# Patient Record
Sex: MALE | Race: BLACK OR AFRICAN AMERICAN | NOT HISPANIC OR LATINO | Employment: FULL TIME | URBAN - METROPOLITAN AREA
[De-identification: names, ages, dates, MRNs, and addresses within clinical notes are randomized per-mention and may not be internally consistent; named-entity substitution may affect disease eponyms.]

---

## 2017-04-26 ENCOUNTER — OFFICE VISIT (OUTPATIENT)
Dept: FAMILY MEDICINE | Facility: CLINIC | Age: 27
End: 2017-04-26
Payer: COMMERCIAL

## 2017-04-26 VITALS
TEMPERATURE: 98 F | HEIGHT: 72 IN | DIASTOLIC BLOOD PRESSURE: 58 MMHG | HEART RATE: 72 BPM | BODY MASS INDEX: 22.27 KG/M2 | SYSTOLIC BLOOD PRESSURE: 118 MMHG | WEIGHT: 164.44 LBS

## 2017-04-26 DIAGNOSIS — Z41.2 MALE CIRCUMCISION: ICD-10-CM

## 2017-04-26 DIAGNOSIS — K64.9 HEMORRHOIDS, UNSPECIFIED HEMORRHOID TYPE: ICD-10-CM

## 2017-04-26 DIAGNOSIS — Z00.00 GENERAL MEDICAL EXAM: Primary | ICD-10-CM

## 2017-04-26 DIAGNOSIS — F17.200 SMOKER: ICD-10-CM

## 2017-04-26 DIAGNOSIS — S03.40XS TMJ (SPRAIN OF TEMPOROMANDIBULAR JOINT), SEQUELA: ICD-10-CM

## 2017-04-26 PROCEDURE — 99999 PR PBB SHADOW E&M-NEW PATIENT-LVL IV: CPT | Mod: PBBFAC,,, | Performed by: FAMILY MEDICINE

## 2017-04-26 PROCEDURE — 99385 PREV VISIT NEW AGE 18-39: CPT | Mod: S$GLB,,, | Performed by: FAMILY MEDICINE

## 2017-04-26 RX ORDER — VARENICLINE TARTRATE 1 MG/1
1 TABLET, FILM COATED ORAL 2 TIMES DAILY
Qty: 60 TABLET | Refills: 3 | Status: SHIPPED | OUTPATIENT
Start: 2017-04-26 | End: 2017-05-08

## 2017-04-26 RX ORDER — VARENICLINE TARTRATE 0.5 (11)-1
KIT ORAL
Qty: 1 PACKAGE | Refills: 0 | Status: SHIPPED | OUTPATIENT
Start: 2017-04-26 | End: 2017-05-08

## 2017-04-26 NOTE — MR AVS SNAPSHOT
[facility]  [address] [facility], [address]  [facility] [address]  Phone: [phone]  Fax: [phone]                  William Schwartz   2017 2:00 PM   Office Visit    Description:  Male : 1990   Provider:  Roberta Butt DO   Department:  [facility]           Reason for Visit     Annual Exam           Diagnoses this Visit        Comments    General medical exam    -  Primary     Hemorrhoids, unspecified hemorrhoid type         TMJ (sprain of temporomandibular joint), sequela         Smoker         Male circumcision                To Do List           Goals (5 Years of Data)     None       These Medications        Disp Refills Start End    varenicline (CHANTIX STARTING MONTH BOX) 0.5 mg (11)- 1 mg (42) tablet 1 Package 0 2017     Take one 0.5mg tab by mouth once daily X3 days,then increase to one 0.5mg tab twice daily X4 days,then increase to one 1mg tab twice daily    Pharmacy: [facility] [address] [place] [address] AVE AT [address] Ph #: [phone]       varenicline (CHANTIX) 1 mg Tab 60 tablet 3 2017     Take 1 tablet (1 mg total) by mouth 2 (two) times daily. - Oral    Pharmacy: [facility] [address] [place] [address] AVE AT [address] Ph #: [phone]         [facility]     [facility] Nurse Care Line -  Assistance  Unless otherwise directed by your provider, please contact [facility], our nurse care line that is available for 24/ assistance.     Registered nurses in the [facility] Center provide: appointment scheduling, clinical advisement, health education, and other advisory services.  Call: [phone] (toll free)               Medications           Message regarding Medications     Verify the changes and/or additions to your medication regime listed below are the same as discussed with your clinician today.   "If any of these changes or additions are incorrect, please notify your healthcare provider.        START taking these NEW medications        Refills    varenicline (CHANTIX STARTING MONTH BOX) 0.5 mg (11)- 1 mg (42) tablet 0    Sig: Take one 0.5mg tab by mouth once daily X3 days,then increase to one 0.5mg tab twice daily X4 days,then increase to one 1mg tab twice daily    Class: Print    varenicline (CHANTIX) 1 mg Tab 3    Sig: Take 1 tablet (1 mg total) by mouth 2 (two) times daily.    Class: Normal    Route: Oral           Verify that the below list of medications is an accurate representation of the medications you are currently taking.  If none reported, the list may be blank. If incorrect, please contact your healthcare provider. Carry this list with you in case of emergency.           Current Medications     varenicline (CHANTIX STARTING MONTH BOX) 0.5 mg (11)- 1 mg (42) tablet Take one 0.5mg tab by mouth once daily X3 days,then increase to one 0.5mg tab twice daily X4 days,then increase to one 1mg tab twice daily    varenicline (CHANTIX) 1 mg Tab Take 1 tablet (1 mg total) by mouth 2 (two) times daily.           Clinical Reference Information           Your Vitals Were     BP Pulse Temp    118/58 (BP Location: Right arm, Patient Position: Sitting, BP Method: Manual) 72 98.3 °F (36.8 °C) (Oral)    Height Weight BMI    5' 11.75" (1.822 m) 74.6 kg (164 lb 7.4 oz) 22.46 kg/m2      Blood Pressure          Most Recent Value    BP  (!)  118/58      Allergies as of 4/26/2017     Shellfish Containing Products      Immunizations Administered on Date of Encounter - 4/26/2017     None      Orders Placed During Today's Visit      Normal Orders This Visit    Ambulatory consult to Urology     Future Labs/Procedures Expected by Expires    CBC auto differential  4/26/2017 6/25/2018    Comprehensive metabolic panel  4/26/2017 6/25/2018    Lipid panel  4/26/2017 6/25/2018    TSH  4/26/2017 6/25/2018      Instructions  "   miralax-       Smoking Cessation     If you would like to quit smoking:   You may be eligible for free services if you are a [place] resident and started smoking cigarettes before September 1, 1988.  Call the Smoking Cessation Trust ([facility]) toll free at [phone] or [phone].   Call 1-800-QUIT-NOW if you do not meet the above criteria.   Contact us via email: [email or web address]   View our website for more information: [email or web address]        Language Assistance Services     ATTENTION: Language assistance services are available, free of charge. Please call [phone].      ATENCIÓN: Si habla español, tiene a carrillo disposición servicios gratuitos de asistencia lingüística. Llame al [phone].     CHÚ Ý: N?u b?n nói Ti?ng Vi?t, có các d?ch v? h? tr? ngôn ng? mi?n phí dành cho b?n. G?i s? [phone].         [facility] complies with applicable Federal civil rights laws and does not discriminate on the basis of race, color, national origin, age, disability, or sex.

## 2017-04-26 NOTE — PROGRESS NOTES
Subjective:       Patient ID: William Schwartz is a 27 y.o. male.    Chief Complaint: Annual Exam    HPI  Review of Systems   Constitutional: Negative.  Negative for appetite change, diaphoresis and fatigue.   HENT: Negative.  Negative for congestion, hearing loss and sneezing.    Eyes: Negative for pain, discharge and visual disturbance.   Respiratory: Negative for apnea, choking, shortness of breath and wheezing.    Cardiovascular: Negative for chest pain and leg swelling.   Gastrointestinal: Negative for abdominal distention, blood in stool, nausea and vomiting.   Genitourinary: Negative for difficulty urinating, discharge, flank pain, testicular pain and urgency.        States last Year he had to go to the ed for a kidney infection- no trouble since.   He is also worried he may have a hemorrhoid or anal fissure. He notice a small bump near the rectum and he feels sore burning after wiping after a BM and he has noticed some blood on tp after wiping. He notices this more chronically for a few months  He is also interested in having a circumcision done   Musculoskeletal: Negative for arthralgias, joint swelling, myalgias, neck pain and neck stiffness.   Skin: Negative.  Negative for color change and rash.   Neurological: Negative.  Negative for dizziness, syncope, weakness and light-headedness.   Hematological: Negative for adenopathy. Does not bruise/bleed easily.   Psychiatric/Behavioral: Negative for agitation, confusion, dysphoric mood and hallucinations.       Objective:      Physical Exam   Constitutional: He is oriented to person, place, and time. He appears well-developed and well-nourished.   HENT:   Head: Normocephalic and atraumatic.   Right Ear: External ear normal.   Left Ear: External ear normal.   Nose: Nose normal.   Mouth/Throat: Oropharynx is clear and moist.   Some crepitus at TM joint and pt has limited ROM with opening his mouth -only about 50% of normal . No pain at joint on palpation   Eyes:  Conjunctivae and EOM are normal. Pupils are equal, round, and reactive to light.   Neck: Normal range of motion. Neck supple. No JVD present. No thyromegaly present.   Cardiovascular: Normal rate, regular rhythm, normal heart sounds and intact distal pulses.    No murmur heard.  Pulmonary/Chest: Effort normal and breath sounds normal.   Abdominal: Soft. Bowel sounds are normal. He exhibits no mass. There is no tenderness.   Musculoskeletal: Normal range of motion. He exhibits no edema.   Lymphadenopathy:     He has no cervical adenopathy.   Neurological: He is alert and oriented to person, place, and time. He has normal reflexes.   Skin: Skin is warm and dry.   Small skin tag about perianal region   Psychiatric: He has a normal mood and affect. His behavior is normal. Judgment and thought content normal.   Pt appears mildly anxious   Vitals reviewed.      Assessment:         William was seen today for annual exam.    Diagnoses and all orders for this visit:    General medical exam  -     CBC auto differential; Future  -     Comprehensive metabolic panel; Future  -     Lipid panel; Future  -     TSH; Future    Hemorrhoids, unspecified hemorrhoid type  Literature provided. Recommend high fiber diet, and miralax and wiping with wet wipes.   If not better (especially if he still notices blood then he should let me know and Id recommend referral to colorectal specialists)    TMJ (sprain of temporomandibular joint), sequela  Since this is not painful there is nothing special to do. This could be an issue with intubation should he ever need that and I recommend he mention it should he need elective surgery    Smoker  Recommend he quit. Discussed chantix and literature and rx provided   varenicline (CHANTIX STARTING MONTH BOX) 0.5 mg (11)- 1 mg (42) tablet; Take one 0.5mg tab by mouth once daily X3 days,then increase to one 0.5mg tab twice daily X4 days,then increase to one 1mg tab twice daily  -     varenicline (CHANTIX) 1 mg  Tab; Take 1 tablet (1 mg total) by mouth 2 (two) times daily.      Male circumcision desired  -     Ambulatory consult to Urology      -

## 2017-05-06 ENCOUNTER — LAB VISIT (OUTPATIENT)
Dept: LAB | Facility: HOSPITAL | Age: 27
End: 2017-05-06
Attending: FAMILY MEDICINE
Payer: COMMERCIAL

## 2017-05-06 DIAGNOSIS — Z00.00 GENERAL MEDICAL EXAM: ICD-10-CM

## 2017-05-06 LAB
ALBUMIN SERPL BCP-MCNC: 4.1 G/DL
ALP SERPL-CCNC: 61 U/L
ALT SERPL W/O P-5'-P-CCNC: 85 U/L
ANION GAP SERPL CALC-SCNC: 8 MMOL/L
AST SERPL-CCNC: 320 U/L
BASOPHILS # BLD AUTO: 0.02 K/UL
BASOPHILS NFR BLD: 0.3 %
BILIRUB SERPL-MCNC: 0.9 MG/DL
BUN SERPL-MCNC: 9 MG/DL
CALCIUM SERPL-MCNC: 9.4 MG/DL
CHLORIDE SERPL-SCNC: 105 MMOL/L
CHOLEST/HDLC SERPL: 1.8 {RATIO}
CO2 SERPL-SCNC: 28 MMOL/L
CREAT SERPL-MCNC: 1.1 MG/DL
DIFFERENTIAL METHOD: NORMAL
EOSINOPHIL # BLD AUTO: 0.2 K/UL
EOSINOPHIL NFR BLD: 3.9 %
ERYTHROCYTE [DISTWIDTH] IN BLOOD BY AUTOMATED COUNT: 13.8 %
EST. GFR  (AFRICAN AMERICAN): >60 ML/MIN/1.73 M^2
EST. GFR  (NON AFRICAN AMERICAN): >60 ML/MIN/1.73 M^2
GLUCOSE SERPL-MCNC: 82 MG/DL
HCT VFR BLD AUTO: 43.8 %
HDL/CHOLESTEROL RATIO: 56.9 %
HDLC SERPL-MCNC: 130 MG/DL
HDLC SERPL-MCNC: 74 MG/DL
HGB BLD-MCNC: 14.1 G/DL
LDLC SERPL CALC-MCNC: 51.8 MG/DL
LYMPHOCYTES # BLD AUTO: 2.4 K/UL
LYMPHOCYTES NFR BLD: 40.2 %
MCH RBC QN AUTO: 28.2 PG
MCHC RBC AUTO-ENTMCNC: 32.2 %
MCV RBC AUTO: 88 FL
MONOCYTES # BLD AUTO: 0.6 K/UL
MONOCYTES NFR BLD: 10.1 %
NEUTROPHILS # BLD AUTO: 2.7 K/UL
NEUTROPHILS NFR BLD: 45.5 %
NONHDLC SERPL-MCNC: 56 MG/DL
PLATELET # BLD AUTO: 218 K/UL
PMV BLD AUTO: 10.7 FL
POTASSIUM SERPL-SCNC: 4.7 MMOL/L
PROT SERPL-MCNC: 7 G/DL
RBC # BLD AUTO: 5 M/UL
SODIUM SERPL-SCNC: 141 MMOL/L
TRIGL SERPL-MCNC: 21 MG/DL
TSH SERPL DL<=0.005 MIU/L-ACNC: 0.63 UIU/ML
WBC # BLD AUTO: 5.87 K/UL

## 2017-05-06 PROCEDURE — 80053 COMPREHEN METABOLIC PANEL: CPT

## 2017-05-06 PROCEDURE — 84443 ASSAY THYROID STIM HORMONE: CPT

## 2017-05-06 PROCEDURE — 80061 LIPID PANEL: CPT

## 2017-05-06 PROCEDURE — 85025 COMPLETE CBC W/AUTO DIFF WBC: CPT

## 2017-05-06 PROCEDURE — 36415 COLL VENOUS BLD VENIPUNCTURE: CPT | Mod: PO

## 2017-05-08 ENCOUNTER — OFFICE VISIT (OUTPATIENT)
Dept: UROLOGY | Facility: CLINIC | Age: 27
End: 2017-05-08
Attending: UROLOGY
Payer: COMMERCIAL

## 2017-05-08 VITALS
SYSTOLIC BLOOD PRESSURE: 130 MMHG | BODY MASS INDEX: 22.54 KG/M2 | HEIGHT: 72 IN | HEART RATE: 68 BPM | WEIGHT: 166.44 LBS | DIASTOLIC BLOOD PRESSURE: 81 MMHG

## 2017-05-08 DIAGNOSIS — N47.1 PHIMOSIS: Primary | ICD-10-CM

## 2017-05-08 PROCEDURE — 99244 OFF/OP CNSLTJ NEW/EST MOD 40: CPT | Mod: S$GLB,,, | Performed by: UROLOGY

## 2017-05-08 PROCEDURE — 99999 PR PBB SHADOW E&M-EST. PATIENT-LVL III: CPT | Mod: PBBFAC,,, | Performed by: UROLOGY

## 2017-05-08 NOTE — H&P
"Subjective:      William Schwartz is a 27 y.o. male who was referred by Roberta Butt DO for evaluation of phimsosis.      He is uncircumcised and has intermittent difficulty retracting foreskin. Also reports occasional pain during intercourse and tears in foreskin. He is currently able to fully retract.    The following portions of the patient's history were reviewed and updated as appropriate: allergies, current medications, past family history, past medical history, past social history, past surgical history and problem list.    Review of Systems  Constitutional: no fever or chills  ENT: no nasal congestion or sore throat  Respiratory: no cough or shortness of breath  Cardiovascular: no chest pain or palpitations  Gastrointestinal: no nausea or vomiting, tolerating diet  Genitourinary: as per HPI  Hematologic/Lymphatic: no easy bruising or lymphadenopathy  Musculoskeletal: no arthralgias or myalgias  Neurological: no seizures or tremors  Behavioral/Psych: no auditory or visual hallucinations     Objective:   Vitals: /81 (BP Location: Left arm, Patient Position: Sitting, BP Method: Automatic)  Pulse 68  Ht 5' 11.5" (1.816 m)  Wt 75.5 kg (166 lb 7.2 oz)  BMI 22.89 kg/m2    Physical Exam   General: alert and oriented, no acute distress  Head: normocephalic, atraumatic  Neck: supple, no lymphadenopathy, normal ROM, no masses  Respiratory: Symmetric expansion, non-labored breathing  Cardiovascular: regular rate and rhythm, nomal pulses, no peripheral edema  Abdomen: soft, non tender, non distended, no palpable masses, no hernias, no hepatomegaly or splenomegaly  Genitourinary:   Penis: no lesions, patent orthotopic meatus, no plaques; mildly phimotic foreskin  Scrotum: no rashes or skin changes;   Testes: descended bilaterally, no masses, nontender, normal epididymides bilaterally, no hydroceles  Skin: normal coloration and turgor, no rashes, no suspicious skin lesions noted  Neuro: alert and oriented x3, no " gross deficits  Psych: normal judgment and insight, normal mood/affect and non-anxious    Lab Review     Lab Results   Component Value Date    WBC 5.87 05/06/2017    HGB 14.1 05/06/2017    HCT 43.8 05/06/2017    MCV 88 05/06/2017     05/06/2017     Lab Results   Component Value Date    CREATININE 1.1 05/06/2017    BUN 9 05/06/2017       Assessment:     1. Phimosis        Plan:   1. Discussed options - topical steroid vs circumcision. He elects for circumcision.  2. The risks and benefits of the procedure were discussed, including but not limited to bleeding, infection, loss of penile sensation, penile shortening, skin bridge, need for further procedures, and complications related to anesthesia.  Informed consent signed and placed in chart.    3. Circ 6/22 at [facility]

## 2017-05-08 NOTE — LETTER
May 8, 2017      Roberta Butt, DO  [address] [facility]  [address]  [facility] [address]           [facility]  [address] [place] [address], [address]  [facility] [address]  Phone: [phone]          Patient: William Schwartz   MR Number: [id number]   YOB: 1990   Date of Visit: 5/8/2017       Dear Dr. Roberta Butt:    Thank you for referring William Schwartz to me for evaluation. Attached you will find relevant portions of my assessment and plan of care.    If you have questions, please do not hesitate to call me. I look forward to following William Schwartz along with you.    Sincerely,    Nicola Rhodes MD    Enclosure  CC:  No Recipients    If you would like to receive this communication electronically, please contact [email or web address] or [phone] to request more information on [employer] Link access.    For providers and/or their staff who would like to refer a patient to [facility], please contact us through our one-stop-shop provider referral line, [facility], at [phone].    If you feel you have received this communication in error or would no longer like to receive these types of communications, please e-mail [email or web address]

## 2017-05-08 NOTE — PROGRESS NOTES
"Subjective:      William Schwartz is a 27 y.o. male who was referred by Roberta Butt DO for evaluation of phimsosis.      He is uncircumcised and has intermittent difficulty retracting foreskin. Also reports occasional pain during intercourse and tears in foreskin. He is currently able to fully retract.    The following portions of the patient's history were reviewed and updated as appropriate: allergies, current medications, past family history, past medical history, past social history, past surgical history and problem list.    Review of Systems  Constitutional: no fever or chills  ENT: no nasal congestion or sore throat  Respiratory: no cough or shortness of breath  Cardiovascular: no chest pain or palpitations  Gastrointestinal: no nausea or vomiting, tolerating diet  Genitourinary: as per HPI  Hematologic/Lymphatic: no easy bruising or lymphadenopathy  Musculoskeletal: no arthralgias or myalgias  Neurological: no seizures or tremors  Behavioral/Psych: no auditory or visual hallucinations     Objective:   Vitals: /81 (BP Location: Left arm, Patient Position: Sitting, BP Method: Automatic)  Pulse 68  Ht 5' 11.5" (1.816 m)  Wt 75.5 kg (166 lb 7.2 oz)  BMI 22.89 kg/m2    Physical Exam   General: alert and oriented, no acute distress  Head: normocephalic, atraumatic  Neck: supple, no lymphadenopathy, normal ROM, no masses  Respiratory: Symmetric expansion, non-labored breathing  Cardiovascular: regular rate and rhythm, nomal pulses, no peripheral edema  Abdomen: soft, non tender, non distended, no palpable masses, no hernias, no hepatomegaly or splenomegaly  Genitourinary:   Penis: no lesions, patent orthotopic meatus, no plaques; mildly phimotic foreskin  Scrotum: no rashes or skin changes;   Testes: descended bilaterally, no masses, nontender, normal epididymides bilaterally, no hydroceles  Skin: normal coloration and turgor, no rashes, no suspicious skin lesions noted  Neuro: alert and oriented x3, no " gross deficits  Psych: normal judgment and insight, normal mood/affect and non-anxious    Lab Review     Lab Results   Component Value Date    WBC 5.87 05/06/2017    HGB 14.1 05/06/2017    HCT 43.8 05/06/2017    MCV 88 05/06/2017     05/06/2017     Lab Results   Component Value Date    CREATININE 1.1 05/06/2017    BUN 9 05/06/2017       Assessment:     1. Phimosis        Plan:   1. Discussed options - topical steroid vs circumcision. He elects for circumcision.  2. The risks and benefits of the procedure were discussed, including but not limited to bleeding, infection, loss of penile sensation, penile shortening, skin bridge, need for further procedures, and complications related to anesthesia.  Informed consent signed and placed in chart.    3. Circ 6/22 at [facility]

## 2017-05-09 DIAGNOSIS — R79.89 ELEVATED LFTS: Primary | ICD-10-CM

## 2017-05-23 ENCOUNTER — PATIENT MESSAGE (OUTPATIENT)
Dept: FAMILY MEDICINE | Facility: CLINIC | Age: 27
End: 2017-05-23

## 2017-05-27 ENCOUNTER — LAB VISIT (OUTPATIENT)
Dept: LAB | Facility: HOSPITAL | Age: 27
End: 2017-05-27
Attending: FAMILY MEDICINE
Payer: COMMERCIAL

## 2017-05-27 DIAGNOSIS — R79.89 ELEVATED LFTS: ICD-10-CM

## 2017-05-27 PROCEDURE — 87340 HEPATITIS B SURFACE AG IA: CPT

## 2017-05-27 PROCEDURE — 86703 HIV-1/HIV-2 1 RESULT ANTBDY: CPT

## 2017-05-27 PROCEDURE — 86200 CCP ANTIBODY: CPT

## 2017-05-27 PROCEDURE — 86706 HEP B SURFACE ANTIBODY: CPT

## 2017-05-27 PROCEDURE — 86038 ANTINUCLEAR ANTIBODIES: CPT

## 2017-05-27 PROCEDURE — 86790 VIRUS ANTIBODY NOS: CPT

## 2017-05-27 PROCEDURE — 86140 C-REACTIVE PROTEIN: CPT

## 2017-05-27 PROCEDURE — 83540 ASSAY OF IRON: CPT

## 2017-05-27 PROCEDURE — 86803 HEPATITIS C AB TEST: CPT

## 2017-05-27 PROCEDURE — 85651 RBC SED RATE NONAUTOMATED: CPT

## 2017-05-27 PROCEDURE — 86431 RHEUMATOID FACTOR QUANT: CPT

## 2017-05-27 PROCEDURE — 82104 ALPHA-1-ANTITRYPSIN PHENO: CPT

## 2017-05-27 PROCEDURE — 84443 ASSAY THYROID STIM HORMONE: CPT

## 2017-05-27 PROCEDURE — 80076 HEPATIC FUNCTION PANEL: CPT

## 2017-05-27 PROCEDURE — 82728 ASSAY OF FERRITIN: CPT

## 2017-05-29 LAB
ALBUMIN SERPL BCP-MCNC: 3.7 G/DL
ALP SERPL-CCNC: 89 U/L
ALT SERPL W/O P-5'-P-CCNC: 18 U/L
ANA SER QL IF: NORMAL
AST SERPL-CCNC: 23 U/L
BILIRUB DIRECT SERPL-MCNC: 0.2 MG/DL
BILIRUB SERPL-MCNC: 0.3 MG/DL
CCP AB SER IA-ACNC: <0.5 U/ML
CRP SERPL-MCNC: <0.1 MG/L
ERYTHROCYTE [SEDIMENTATION RATE] IN BLOOD BY WESTERGREN METHOD: 1 MM/HR
FERRITIN SERPL-MCNC: 67 NG/ML
HBV SURFACE AB SER-ACNC: NEGATIVE M[IU]/ML
HBV SURFACE AG SERPL QL IA: NEGATIVE
HCV AB SERPL QL IA: NEGATIVE
HEPATITIS A ANTIBODY, IGG: NEGATIVE
HIV 1+2 AB+HIV1 P24 AG SERPL QL IA: NEGATIVE
IRON SERPL-MCNC: 61 UG/DL
PROT SERPL-MCNC: 6.5 G/DL
RHEUMATOID FACT SERPL-ACNC: <10 IU/ML
SATURATED IRON: 19 %
TOTAL IRON BINDING CAPACITY: 321 UG/DL
TRANSFERRIN SERPL-MCNC: 217 MG/DL
TSH SERPL DL<=0.005 MIU/L-ACNC: 0.73 UIU/ML

## 2017-06-01 LAB
A1AT PHENOTYP SERPL-IMP: ABNORMAL BANDS
A1AT SERPL NEPH-MCNC: 97 MG/DL

## 2017-06-17 ENCOUNTER — DOCUMENTATION ONLY (OUTPATIENT)
Dept: FAMILY MEDICINE | Facility: CLINIC | Age: 27
End: 2017-06-17

## 2017-06-17 DIAGNOSIS — R79.89 ELEVATED LFTS: Primary | ICD-10-CM

## 2017-06-19 ENCOUNTER — TELEPHONE (OUTPATIENT)
Dept: UROLOGY | Facility: CLINIC | Age: 27
End: 2017-06-19

## 2017-06-19 NOTE — TELEPHONE ENCOUNTER
Patient called the office to cancel his CI[facility] on 6/22/17.  He states he will call back to reschedule sometime in late August or September

## 2017-06-19 NOTE — TELEPHONE ENCOUNTER
----- Message from Loren Canales RN sent at 6/19/2017  9:00 AM CDT -----  Regarding: postpone surgery  Patient called to cancel his preadmit appt.  States he wants to postpone surgery date.  I gave him the Urology phone number and instructed him to call the office to get a new surgery date.    Loren

## 2017-07-01 ENCOUNTER — LAB VISIT (OUTPATIENT)
Dept: LAB | Facility: HOSPITAL | Age: 27
End: 2017-07-01
Attending: FAMILY MEDICINE
Payer: COMMERCIAL

## 2017-07-01 DIAGNOSIS — R79.89 ELEVATED LFTS: ICD-10-CM

## 2017-07-01 PROCEDURE — 82103 ALPHA-1-ANTITRYPSIN TOTAL: CPT

## 2017-07-01 PROCEDURE — 36415 COLL VENOUS BLD VENIPUNCTURE: CPT | Mod: PO

## 2017-07-03 ENCOUNTER — PATIENT MESSAGE (OUTPATIENT)
Dept: FAMILY MEDICINE | Facility: CLINIC | Age: 27
End: 2017-07-03

## 2017-07-03 LAB — A1AT SERPL-MCNC: 121 MG/DL

## 2019-01-07 ENCOUNTER — OFFICE VISIT (OUTPATIENT)
Dept: FAMILY MEDICINE | Facility: CLINIC | Age: 29
End: 2019-01-07
Payer: COMMERCIAL

## 2019-01-07 VITALS
TEMPERATURE: 98 F | SYSTOLIC BLOOD PRESSURE: 102 MMHG | WEIGHT: 177.69 LBS | HEART RATE: 64 BPM | DIASTOLIC BLOOD PRESSURE: 60 MMHG | HEIGHT: 72 IN | BODY MASS INDEX: 24.07 KG/M2

## 2019-01-07 DIAGNOSIS — L03.90 CELLULITIS, UNSPECIFIED CELLULITIS SITE: ICD-10-CM

## 2019-01-07 DIAGNOSIS — H60.93 OTITIS EXTERNA OF BOTH EARS, UNSPECIFIED CHRONICITY, UNSPECIFIED TYPE: Primary | ICD-10-CM

## 2019-01-07 PROCEDURE — 99213 OFFICE O/P EST LOW 20 MIN: CPT | Mod: S$GLB,,, | Performed by: FAMILY MEDICINE

## 2019-01-07 PROCEDURE — 99999 PR PBB SHADOW E&M-EST. PATIENT-LVL III: ICD-10-PCS | Mod: PBBFAC,,, | Performed by: FAMILY MEDICINE

## 2019-01-07 PROCEDURE — 99213 PR OFFICE/OUTPT VISIT, EST, LEVL III, 20-29 MIN: ICD-10-PCS | Mod: S$GLB,,, | Performed by: FAMILY MEDICINE

## 2019-01-07 PROCEDURE — 99999 PR PBB SHADOW E&M-EST. PATIENT-LVL III: CPT | Mod: PBBFAC,,, | Performed by: FAMILY MEDICINE

## 2019-01-07 RX ORDER — CEPHALEXIN 500 MG/1
500 CAPSULE ORAL EVERY 6 HOURS
Qty: 30 CAPSULE | Refills: 0 | Status: SHIPPED | OUTPATIENT
Start: 2019-01-07 | End: 2019-05-07

## 2019-01-07 RX ORDER — NEOMYCIN SULFATE, POLYMYXIN B SULFATE AND HYDROCORTISONE 10; 3.5; 1 MG/ML; MG/ML; [USP'U]/ML
3 SUSPENSION/ DROPS AURICULAR (OTIC) 3 TIMES DAILY
Qty: 10 ML | Refills: 3 | Status: SHIPPED | OUTPATIENT
Start: 2019-01-07 | End: 2019-05-07

## 2019-01-07 NOTE — PROGRESS NOTES
Subjective:       Patient ID: William Schwartz is a 28 y.o. male.    Chief Complaint: Otalgia (Bilateral / )    27 yo presents today for evaluation of bilateral ear drainage and pain in right ear.  Pt has to wear noise protection at work and noted recurrent drainage from both ears and some discomfort for over one week.  He has been using Qtips and hydrogen peroxide to clean out his ears.  Over the past 2 days, he noted some fissuring over the right outer ear and noted swelling/discomfort over the past day.  No hearing loss      Review of Systems   HENT: Positive for ear discharge and ear pain. Negative for congestion, hearing loss and rhinorrhea.        Objective:      Physical Exam   Constitutional: He appears well-developed and well-nourished. No distress.   HENT:   Head:       Canals erythematous and swollen bilaterally  Over the helix of right ear there is fissuring and honey colored crusting.  No fluctuance or drainage noted  Pt has 1/2cm non tender node, right post auricular area   Lymphadenopathy:     He has no cervical adenopathy.       Assessment:       1. Otitis externa of both ears, unspecified chronicity, unspecified type    2. Cellulitis, unspecified cellulitis site        Plan:        1.  Keep ears dry, avoid Qtips  2.  Keflex 500mg tid  3.  Neomycin otic soln  4.  F/u 2-3 days if not improved

## 2019-02-07 ENCOUNTER — OFFICE VISIT (OUTPATIENT)
Dept: PSYCHIATRY | Facility: CLINIC | Age: 29
End: 2019-02-07
Payer: COMMERCIAL

## 2019-02-07 DIAGNOSIS — Z03.89 NO DIAGNOSIS ON AXIS I: Primary | ICD-10-CM

## 2019-02-07 PROCEDURE — 99999 PR PBB SHADOW E&M-EST. PATIENT-LVL II: CPT | Mod: PBBFAC,,, | Performed by: SOCIAL WORKER

## 2019-02-07 PROCEDURE — 90791 PSYCH DIAGNOSTIC EVALUATION: CPT | Mod: S$GLB,,, | Performed by: SOCIAL WORKER

## 2019-02-07 PROCEDURE — 90791 PR PSYCHIATRIC DIAGNOSTIC EVALUATION: ICD-10-PCS | Mod: S$GLB,,, | Performed by: SOCIAL WORKER

## 2019-02-07 PROCEDURE — 99999 PR PBB SHADOW E&M-EST. PATIENT-LVL II: ICD-10-PCS | Mod: PBBFAC,,, | Performed by: SOCIAL WORKER

## 2019-02-19 ENCOUNTER — OFFICE VISIT (OUTPATIENT)
Dept: PSYCHIATRY | Facility: CLINIC | Age: 29
End: 2019-02-19
Payer: COMMERCIAL

## 2019-02-19 DIAGNOSIS — Z03.89 NO DIAGNOSIS ON AXIS I: Primary | ICD-10-CM

## 2019-02-19 PROCEDURE — 99999 PR PBB SHADOW E&M-EST. PATIENT-LVL II: ICD-10-PCS | Mod: PBBFAC,,, | Performed by: SOCIAL WORKER

## 2019-02-19 PROCEDURE — 90834 PR PSYCHOTHERAPY W/PATIENT, 45 MIN: ICD-10-PCS | Mod: S$GLB,,, | Performed by: SOCIAL WORKER

## 2019-02-19 PROCEDURE — 90834 PSYTX W PT 45 MINUTES: CPT | Mod: S$GLB,,, | Performed by: SOCIAL WORKER

## 2019-02-19 PROCEDURE — 99999 PR PBB SHADOW E&M-EST. PATIENT-LVL II: CPT | Mod: PBBFAC,,, | Performed by: SOCIAL WORKER

## 2019-02-28 ENCOUNTER — OFFICE VISIT (OUTPATIENT)
Dept: PSYCHIATRY | Facility: CLINIC | Age: 29
End: 2019-02-28
Payer: COMMERCIAL

## 2019-02-28 DIAGNOSIS — Z03.89 NO DIAGNOSIS ON AXIS I: ICD-10-CM

## 2019-02-28 DIAGNOSIS — R41.840 DIFFICULTY CONCENTRATING: Primary | ICD-10-CM

## 2019-02-28 LAB
AMP D-AMPHETAMINE 1000 NG/ML: NEGATIVE
BAR SECOBARBITAL 300 NG/ML: NEGATIVE
BUP BUPRENORPHINE 10 NG/ML: NEGATIVE
BZO OXAZEPAM 300 NG/ML: NEGATIVE
COC BENZOYLECGONINE 300 NG/ML: NEGATIVE
MAMP D-METHAMPHETAMINE 1000 NG/ML: NEGATIVE
MOP MORPHINE 300 NG/ML: NEGATIVE
MTD METHADONE 300 NG/ML: NEGATIVE
QXY OXYCODONE 100 NG/ML: NEGATIVE
THC 11-NOR-9-TETRAHYDROCANNABINOL-9-CARBOXYLIC ACID: NEGATIVE

## 2019-02-28 PROCEDURE — 90834 PR PSYCHOTHERAPY W/PATIENT, 45 MIN: ICD-10-PCS | Mod: S$GLB,,, | Performed by: SOCIAL WORKER

## 2019-02-28 PROCEDURE — 99999 PR PBB SHADOW E&M-EST. PATIENT-LVL II: ICD-10-PCS | Mod: PBBFAC,,, | Performed by: SOCIAL WORKER

## 2019-02-28 PROCEDURE — 99999 PR PBB SHADOW E&M-EST. PATIENT-LVL II: CPT | Mod: PBBFAC,,, | Performed by: SOCIAL WORKER

## 2019-02-28 PROCEDURE — 90834 PSYTX W PT 45 MINUTES: CPT | Mod: S$GLB,,, | Performed by: SOCIAL WORKER

## 2019-05-03 ENCOUNTER — PATIENT MESSAGE (OUTPATIENT)
Dept: FAMILY MEDICINE | Facility: CLINIC | Age: 29
End: 2019-05-03

## 2019-05-07 ENCOUNTER — OFFICE VISIT (OUTPATIENT)
Dept: PRIMARY CARE CLINIC | Facility: CLINIC | Age: 29
End: 2019-05-07
Payer: COMMERCIAL

## 2019-05-07 VITALS
RESPIRATION RATE: 20 BRPM | WEIGHT: 174.63 LBS | HEIGHT: 72 IN | BODY MASS INDEX: 23.65 KG/M2 | SYSTOLIC BLOOD PRESSURE: 102 MMHG | TEMPERATURE: 98 F | DIASTOLIC BLOOD PRESSURE: 66 MMHG

## 2019-05-07 DIAGNOSIS — Z72.0 TOBACCO USE: ICD-10-CM

## 2019-05-07 DIAGNOSIS — N52.9 ERECTILE DYSFUNCTION, UNSPECIFIED ERECTILE DYSFUNCTION TYPE: ICD-10-CM

## 2019-05-07 DIAGNOSIS — Z28.39 IMMUNIZATION DEFICIENCY: ICD-10-CM

## 2019-05-07 DIAGNOSIS — Z00.00 ANNUAL PHYSICAL EXAM: Primary | ICD-10-CM

## 2019-05-07 DIAGNOSIS — Z00.00 ROUTINE GENERAL MEDICAL EXAMINATION AT A HEALTH CARE FACILITY: Primary | ICD-10-CM

## 2019-05-07 PROBLEM — F17.200 SMOKER: Status: RESOLVED | Noted: 2017-04-26 | Resolved: 2019-05-07

## 2019-05-07 PROCEDURE — 99999 PR PBB SHADOW E&M-EST. PATIENT-LVL III: CPT | Mod: PBBFAC,,, | Performed by: FAMILY MEDICINE

## 2019-05-07 PROCEDURE — 90715 TDAP VACCINE 7 YRS/> IM: CPT | Mod: S$GLB,,, | Performed by: FAMILY MEDICINE

## 2019-05-07 PROCEDURE — 99395 PR PREVENTIVE VISIT,EST,18-39: ICD-10-PCS | Mod: 25,S$GLB,, | Performed by: FAMILY MEDICINE

## 2019-05-07 PROCEDURE — 99395 PREV VISIT EST AGE 18-39: CPT | Mod: 25,S$GLB,, | Performed by: FAMILY MEDICINE

## 2019-05-07 PROCEDURE — 90715 TDAP VACCINE GREATER THAN OR EQUAL TO 7YO IM: ICD-10-PCS | Mod: S$GLB,,, | Performed by: FAMILY MEDICINE

## 2019-05-07 PROCEDURE — 90471 TDAP VACCINE GREATER THAN OR EQUAL TO 7YO IM: ICD-10-PCS | Mod: S$GLB,,, | Performed by: FAMILY MEDICINE

## 2019-05-07 PROCEDURE — 87491 CHLMYD TRACH DNA AMP PROBE: CPT

## 2019-05-07 PROCEDURE — 90471 IMMUNIZATION ADMIN: CPT | Mod: S$GLB,,, | Performed by: FAMILY MEDICINE

## 2019-05-07 PROCEDURE — 99999 PR PBB SHADOW E&M-EST. PATIENT-LVL III: ICD-10-PCS | Mod: PBBFAC,,, | Performed by: FAMILY MEDICINE

## 2019-05-07 NOTE — PROGRESS NOTES
Subjective:      Patient ID: William Schwartz is a 29 y.o. male.    Chief Complaint: Annual Exam    Here today for general exam.     He notices more difficulty with erectile dysfunction in the past 8 months.  He would like to have testosterone blood work checked along with general labs and STD testing.  He has 2 young children.  Denies any penile discharge or pain.    He smokes tobacco, 1 pack per day, he has notes on the chart for visits with Psychiatry as he has a history of marijuana use (tested positive at work. His liver tests were elevated in the past with previous primary physician because he was consuming alcohol on a daily basis.  He states eventually, the numbers decreased as he decreased his alcohol use.    Denies any chest pain, shortness of breath, nausea vomiting constipation diarrhea, blood in stool, heartburn    No current outpatient medications on file.      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, smokes 1ppd, ETOH 1 beer daily     Family History   Problem Relation Age of Onset    Diabetes Mother         type 2    No Known Problems Father     Migraines Sister     Diabetes Brother         type 2    No Known Problems Son     Heart disease Maternal Grandmother     No Known Problems Brother     No Known Problems Brother     No Known Problems Sister     Cancer Paternal Aunt      Vitals:    05/07/19 1402   BP: 102/66   Resp: 20   Temp: 98.1 °F (36.7 °C)   Weight: 79.2 kg (174 lb 9.7 oz)   Height: 6' (1.829 m)     Objective:   Physical Exam   Constitutional: He is oriented to person, place, and time. He appears well-developed and well-nourished.   HENT:   Right Ear: External ear normal.   Left Ear: External ear normal.   Nose: Nose normal.    Mouth/Throat: Oropharynx is clear and moist.   Eyes: Pupils are equal, round, and reactive to light. EOM are normal.   Neck: Neck supple. No thyromegaly present.   Cardiovascular: Normal rate, regular rhythm and normal heart sounds.   No murmur heard.  Pulmonary/Chest: Effort normal and breath sounds normal. He has no wheezes. He has no rales.   Abdominal: Soft. Bowel sounds are normal. He exhibits no mass. There is no tenderness. There is no rebound and no guarding. Hernia confirmed negative in the right inguinal area and confirmed negative in the left inguinal area.   No hepatomegaly   Genitourinary: Testes normal and penis normal.   Genitourinary Comments: Normal uncircumcised male, normal size testes, nontender no mass, no inguinal hernia, no lesion     Lymphadenopathy:     He has no cervical adenopathy.   Neurological: He is alert and oriented to person, place, and time.   Skin: Skin is warm and dry.   Vitals reviewed.    Assessment:     1. Routine general medical examination at a health care facility    2. Immunization deficiency    3. Tobacco use    4. Erectile dysfunction, unspecified erectile dysfunction type      Plan:     Orders Placed This Encounter    C. trachomatis/N. gonorrhoeae by AMP DNA    (In Office Administered) Tdap Vaccine    CBC auto differential    Comprehensive metabolic panel    Lipid panel    HIV 1/2 Ag/Ab (4th Gen)    RPR    Hepatitis C antibody    Testosterone, free       We discussed at length erectile dysfunction and causes.  He has several things that could affect blood flow to the penis including daily tobacco use, history of marijuana use , alcohol use.  He voices understanding and would like to do the above testing    Patient Instructions   Due for  Vaccines  - tetanus    I will email results    =======    ============================  I counselled patient to stop smoking - high risk of cancer , heart attack and stroke.    FREE SMOKING CESSATION PROGRAM AT [facility]  [facility].  Sign up for online ID card [email or web address]  Or  [email or web address]. Then call  [phone].      ================================  RECOMMENDATIONS FOR MALES   ================================    Your #1 MEDICINE is DAILY EXERCISE - 15-20 minutes of huffing & puffing EVERY DAY.     Prevent the #1 cause of death- cardiovascular disease (HEART ATTACK & STROKE) by checking for normal blood pressure, cholesterol, sugars, & by not smoking.     VACCINES: Yearly FLU shot,  I recommend  high fiber (5 fresh fruits or vegetables daily), low fat diet and aerobic  exercise (huffing/ puffing/ sweating for 20 min straight at least 4 days a week)    Follow up yearly with fasting lipids, CMP, CBC  prior                                  Answers for HPI/ROS submitted by the patient on 5/7/2019   activity change: No  unexpected weight change: No  neck pain: No  hearing loss: No  rhinorrhea: No  trouble swallowing: No  eye discharge: No  visual disturbance: No  chest tightness: No  wheezing: No  chest pain: No  palpitations: No  blood in stool: No  constipation: No  vomiting: No  diarrhea: No  polydipsia: No  polyuria: No  difficulty urinating: No  urgency: No  hematuria: No  joint swelling: No  arthralgias: No  headaches: No  weakness: No  confusion: No  dysphoric mood: No

## 2019-05-07 NOTE — PROGRESS NOTES
Two patient identifiers used, allergies reviewed, vaccine confirmed.  Tdap vaccine administered IM right deltoid.  Pt tolerated well, no redness or bruising at injection site.  Pt advised to remain in clinic 15 mins following injection for observation.

## 2019-05-07 NOTE — PATIENT INSTRUCTIONS
Due for  Vaccines  - tetanus    I will email results    =======    ============================  I counselled patient to stop smoking - high risk of cancer , heart attack and stroke.    FREE SMOKING CESSATION PROGRAM AT [facility] [address]  Sign up for online ID card [email or web address]  Or  [email or web address]. Then call  [phone].      ================================  RECOMMENDATIONS FOR MALES   ================================    Your #1 MEDICINE is DAILY EXERCISE - 15-20 minutes of huffing & puffing EVERY DAY.     Prevent the #1 cause of death- cardiovascular disease (HEART ATTACK & STROKE) by checking for normal blood pressure, cholesterol, sugars, & by not smoking.     VACCINES: Yearly FLU shot,  I recommend  high fiber (5 fresh fruits or vegetables daily), low fat diet and aerobic  exercise (huffing/ puffing/ sweating for 20 min straight at least 4 days a week)    Follow up yearly with fasting lipids, CMP, CBC  prior

## 2019-05-08 ENCOUNTER — LAB VISIT (OUTPATIENT)
Dept: LAB | Facility: HOSPITAL | Age: 29
End: 2019-05-08
Attending: FAMILY MEDICINE
Payer: COMMERCIAL

## 2019-05-08 DIAGNOSIS — N52.9 ERECTILE DYSFUNCTION, UNSPECIFIED ERECTILE DYSFUNCTION TYPE: ICD-10-CM

## 2019-05-08 DIAGNOSIS — Z00.00 ANNUAL PHYSICAL EXAM: ICD-10-CM

## 2019-05-08 LAB
ALBUMIN SERPL BCP-MCNC: 4.3 G/DL (ref 3.5–5.2)
ALP SERPL-CCNC: 66 U/L (ref 55–135)
ALT SERPL W/O P-5'-P-CCNC: 19 U/L (ref 10–44)
ANION GAP SERPL CALC-SCNC: 7 MMOL/L (ref 8–16)
AST SERPL-CCNC: 22 U/L (ref 10–40)
BASOPHILS # BLD AUTO: 0.06 K/UL (ref 0–0.2)
BASOPHILS NFR BLD: 1.4 % (ref 0–1.9)
BILIRUB SERPL-MCNC: 0.7 MG/DL (ref 0.1–1)
BUN SERPL-MCNC: 12 MG/DL (ref 6–20)
C TRACH DNA SPEC QL NAA+PROBE: NOT DETECTED
CALCIUM SERPL-MCNC: 10 MG/DL (ref 8.7–10.5)
CHLORIDE SERPL-SCNC: 104 MMOL/L (ref 95–110)
CHOLEST SERPL-MCNC: 127 MG/DL (ref 120–199)
CHOLEST/HDLC SERPL: 2 {RATIO} (ref 2–5)
CO2 SERPL-SCNC: 29 MMOL/L (ref 23–29)
CREAT SERPL-MCNC: 1.1 MG/DL (ref 0.5–1.4)
DIFFERENTIAL METHOD: ABNORMAL
EOSINOPHIL # BLD AUTO: 0.3 K/UL (ref 0–0.5)
EOSINOPHIL NFR BLD: 6.3 % (ref 0–8)
ERYTHROCYTE [DISTWIDTH] IN BLOOD BY AUTOMATED COUNT: 13.9 % (ref 11.5–14.5)
EST. GFR  (AFRICAN AMERICAN): >60 ML/MIN/1.73 M^2
EST. GFR  (NON AFRICAN AMERICAN): >60 ML/MIN/1.73 M^2
GLUCOSE SERPL-MCNC: 80 MG/DL (ref 70–110)
HCT VFR BLD AUTO: 45.1 % (ref 40–54)
HDLC SERPL-MCNC: 62 MG/DL (ref 40–75)
HDLC SERPL: 48.8 % (ref 20–50)
HGB BLD-MCNC: 14 G/DL (ref 14–18)
IMM GRANULOCYTES # BLD AUTO: 0 K/UL (ref 0–0.04)
IMM GRANULOCYTES NFR BLD AUTO: 0 % (ref 0–0.5)
LDLC SERPL CALC-MCNC: 59 MG/DL (ref 63–159)
LYMPHOCYTES # BLD AUTO: 1.7 K/UL (ref 1–4.8)
LYMPHOCYTES NFR BLD: 38.7 % (ref 18–48)
MCH RBC QN AUTO: 28.1 PG (ref 27–31)
MCHC RBC AUTO-ENTMCNC: 31 G/DL (ref 32–36)
MCV RBC AUTO: 91 FL (ref 82–98)
MONOCYTES # BLD AUTO: 0.6 K/UL (ref 0.3–1)
MONOCYTES NFR BLD: 12.8 % (ref 4–15)
N GONORRHOEA DNA SPEC QL NAA+PROBE: NOT DETECTED
NEUTROPHILS # BLD AUTO: 1.8 K/UL (ref 1.8–7.7)
NEUTROPHILS NFR BLD: 40.8 % (ref 38–73)
NONHDLC SERPL-MCNC: 65 MG/DL
NRBC BLD-RTO: 0 /100 WBC
PLATELET # BLD AUTO: 225 K/UL (ref 150–350)
PMV BLD AUTO: 10.5 FL (ref 9.2–12.9)
POTASSIUM SERPL-SCNC: 4.7 MMOL/L (ref 3.5–5.1)
PROT SERPL-MCNC: 7.2 G/DL (ref 6–8.4)
RBC # BLD AUTO: 4.98 M/UL (ref 4.6–6.2)
SODIUM SERPL-SCNC: 140 MMOL/L (ref 136–145)
TRIGL SERPL-MCNC: 30 MG/DL (ref 30–150)
WBC # BLD AUTO: 4.29 K/UL (ref 3.9–12.7)

## 2019-05-08 PROCEDURE — 86803 HEPATITIS C AB TEST: CPT

## 2019-05-08 PROCEDURE — 86592 SYPHILIS TEST NON-TREP QUAL: CPT

## 2019-05-08 PROCEDURE — 85025 COMPLETE CBC W/AUTO DIFF WBC: CPT

## 2019-05-08 PROCEDURE — 86703 HIV-1/HIV-2 1 RESULT ANTBDY: CPT

## 2019-05-08 PROCEDURE — 80053 COMPREHEN METABOLIC PANEL: CPT

## 2019-05-08 PROCEDURE — 84402 ASSAY OF FREE TESTOSTERONE: CPT

## 2019-05-08 PROCEDURE — 80061 LIPID PANEL: CPT

## 2019-05-08 NOTE — PROGRESS NOTES
Hello!  Your gonorrhea & chalmydia tests are NEGATIVE      I'll let you  know when the other results are complete

## 2019-05-09 LAB
HCV AB SERPL QL IA: NEGATIVE
HIV 1+2 AB+HIV1 P24 AG SERPL QL IA: NEGATIVE
RPR SER QL: NORMAL
TESTOST FREE SERPL-MCNC: 16.5 PG/ML (ref 5.1–41.5)

## 2019-05-10 NOTE — PROGRESS NOTES
Hello!    Your HIV, syphillis (RPR) and Hepatitis C labs are NEGATIVE    Since your  blood count (Hemoglobin) is normal, you are not anemic.     Since the sugar test (Glucose)  is normal, you do not have diabetes.     Your kidney (Creatinine)  and liver (AST, ALT) function is  normal. These are the filters in your body for medicine, food and liquids that you ingest.     Your Cholesterol is NORMAL    Focus on low fat, high fiber foods and aerobic exericse (huffing & puffing) for at least 20 minutes most days of the week.    Let's recheck your basic fasting bloodwork PRIOR to your yearly physical with me -- so we can discuss results at your visit    Take care!

## 2020-09-15 ENCOUNTER — LAB VISIT (OUTPATIENT)
Dept: LAB | Facility: HOSPITAL | Age: 30
End: 2020-09-15
Attending: FAMILY MEDICINE
Payer: COMMERCIAL

## 2020-09-15 ENCOUNTER — OFFICE VISIT (OUTPATIENT)
Dept: PRIMARY CARE CLINIC | Facility: CLINIC | Age: 30
End: 2020-09-15
Payer: COMMERCIAL

## 2020-09-15 VITALS
WEIGHT: 176.38 LBS | BODY MASS INDEX: 23.89 KG/M2 | HEIGHT: 72 IN | DIASTOLIC BLOOD PRESSURE: 65 MMHG | HEART RATE: 56 BPM | SYSTOLIC BLOOD PRESSURE: 102 MMHG

## 2020-09-15 DIAGNOSIS — Z00.00 ROUTINE GENERAL MEDICAL EXAMINATION AT A HEALTH CARE FACILITY: Primary | ICD-10-CM

## 2020-09-15 DIAGNOSIS — Z72.0 TOBACCO USE: ICD-10-CM

## 2020-09-15 DIAGNOSIS — Z00.00 ROUTINE GENERAL MEDICAL EXAMINATION AT A HEALTH CARE FACILITY: ICD-10-CM

## 2020-09-15 PROBLEM — N52.9 ERECTILE DYSFUNCTION: Status: RESOLVED | Noted: 2019-05-07 | Resolved: 2020-09-15

## 2020-09-15 LAB
ALBUMIN SERPL BCP-MCNC: 4.5 G/DL (ref 3.5–5.2)
ALP SERPL-CCNC: 70 U/L (ref 55–135)
ALT SERPL W/O P-5'-P-CCNC: 17 U/L (ref 10–44)
ANION GAP SERPL CALC-SCNC: 10 MMOL/L (ref 8–16)
AST SERPL-CCNC: 22 U/L (ref 10–40)
BASOPHILS # BLD AUTO: 0.06 K/UL (ref 0–0.2)
BASOPHILS NFR BLD: 1.4 % (ref 0–1.9)
BILIRUB SERPL-MCNC: 0.5 MG/DL (ref 0.1–1)
BUN SERPL-MCNC: 16 MG/DL (ref 6–20)
CALCIUM SERPL-MCNC: 9.3 MG/DL (ref 8.7–10.5)
CHLORIDE SERPL-SCNC: 105 MMOL/L (ref 95–110)
CHOLEST SERPL-MCNC: 149 MG/DL (ref 120–199)
CHOLEST/HDLC SERPL: 2.4 {RATIO} (ref 2–5)
CO2 SERPL-SCNC: 25 MMOL/L (ref 23–29)
CREAT SERPL-MCNC: 1.3 MG/DL (ref 0.5–1.4)
DIFFERENTIAL METHOD: ABNORMAL
EOSINOPHIL # BLD AUTO: 0.1 K/UL (ref 0–0.5)
EOSINOPHIL NFR BLD: 2.5 % (ref 0–8)
ERYTHROCYTE [DISTWIDTH] IN BLOOD BY AUTOMATED COUNT: 14.1 % (ref 11.5–14.5)
EST. GFR  (AFRICAN AMERICAN): >60 ML/MIN/1.73 M^2
EST. GFR  (NON AFRICAN AMERICAN): >60 ML/MIN/1.73 M^2
GLUCOSE SERPL-MCNC: 74 MG/DL (ref 70–110)
HCT VFR BLD AUTO: 46.5 % (ref 40–54)
HDLC SERPL-MCNC: 62 MG/DL (ref 40–75)
HDLC SERPL: 41.6 % (ref 20–50)
HGB BLD-MCNC: 14.1 G/DL (ref 14–18)
IMM GRANULOCYTES # BLD AUTO: 0.01 K/UL (ref 0–0.04)
IMM GRANULOCYTES NFR BLD AUTO: 0.2 % (ref 0–0.5)
LDLC SERPL CALC-MCNC: 80.4 MG/DL (ref 63–159)
LYMPHOCYTES # BLD AUTO: 2 K/UL (ref 1–4.8)
LYMPHOCYTES NFR BLD: 46.2 % (ref 18–48)
MCH RBC QN AUTO: 28.4 PG (ref 27–31)
MCHC RBC AUTO-ENTMCNC: 30.3 G/DL (ref 32–36)
MCV RBC AUTO: 94 FL (ref 82–98)
MONOCYTES # BLD AUTO: 0.4 K/UL (ref 0.3–1)
MONOCYTES NFR BLD: 10.1 % (ref 4–15)
NEUTROPHILS # BLD AUTO: 1.7 K/UL (ref 1.8–7.7)
NEUTROPHILS NFR BLD: 39.6 % (ref 38–73)
NONHDLC SERPL-MCNC: 87 MG/DL
NRBC BLD-RTO: 0 /100 WBC
PLATELET # BLD AUTO: 223 K/UL (ref 150–350)
PMV BLD AUTO: 10.3 FL (ref 9.2–12.9)
POTASSIUM SERPL-SCNC: 4.9 MMOL/L (ref 3.5–5.1)
PROT SERPL-MCNC: 7.3 G/DL (ref 6–8.4)
RBC # BLD AUTO: 4.97 M/UL (ref 4.6–6.2)
SODIUM SERPL-SCNC: 140 MMOL/L (ref 136–145)
TRIGL SERPL-MCNC: 33 MG/DL (ref 30–150)
WBC # BLD AUTO: 4.35 K/UL (ref 3.9–12.7)

## 2020-09-15 PROCEDURE — 86703 HIV-1/HIV-2 1 RESULT ANTBDY: CPT

## 2020-09-15 PROCEDURE — 80053 COMPREHEN METABOLIC PANEL: CPT

## 2020-09-15 PROCEDURE — 99395 PR PREVENTIVE VISIT,EST,18-39: ICD-10-PCS | Mod: S$GLB,,, | Performed by: FAMILY MEDICINE

## 2020-09-15 PROCEDURE — 99999 PR PBB SHADOW E&M-EST. PATIENT-LVL III: ICD-10-PCS | Mod: PBBFAC,,, | Performed by: FAMILY MEDICINE

## 2020-09-15 PROCEDURE — 86803 HEPATITIS C AB TEST: CPT

## 2020-09-15 PROCEDURE — 86592 SYPHILIS TEST NON-TREP QUAL: CPT

## 2020-09-15 PROCEDURE — 36415 COLL VENOUS BLD VENIPUNCTURE: CPT | Mod: PN

## 2020-09-15 PROCEDURE — 80061 LIPID PANEL: CPT

## 2020-09-15 PROCEDURE — 99395 PREV VISIT EST AGE 18-39: CPT | Mod: S$GLB,,, | Performed by: FAMILY MEDICINE

## 2020-09-15 PROCEDURE — 99999 PR PBB SHADOW E&M-EST. PATIENT-LVL III: CPT | Mod: PBBFAC,,, | Performed by: FAMILY MEDICINE

## 2020-09-15 PROCEDURE — 85025 COMPLETE CBC W/AUTO DIFF WBC: CPT

## 2020-09-15 NOTE — PROGRESS NOTES
Subjective:      Patient ID: William Schwartz is a 30 y.o. male.    Chief Complaint: Annual Exam    Here today for general exam.     He works out of town.    He would like to check STD blood work, no symptoms      Still smokes 1 pack per day    Denies any chest pain, shortness of breath, nausea vomiting constipation diarrhea, blood in stool, heartburn    No current outpatient medications on file.        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   HENT:      Head: Normocephalic and atraumatic.      Nose:      Comments: Wearing mask due to current COVID 19 pandemic, Nose & mouth exam deferred  Eyes:      Pupils: Pupils are equal, round, and reactive to light.   Neck:      Musculoskeletal: Neck supple.      Thyroid: No thyromegaly.   Cardiovascular:      Rate and Rhythm: Normal rate and regular rhythm.      Heart sounds: Normal heart sounds. No murmur.   Pulmonary:      Effort: Pulmonary effort is normal.      Breath sounds: Normal breath sounds. No wheezing.   Abdominal:      General: Bowel sounds are normal. There is no distension.      Palpations: Abdomen is soft. There is no mass.      Tenderness: There is  no abdominal tenderness. There is no guarding or rebound.   Lymphadenopathy:      Cervical: No cervical adenopathy.   Skin:     General: Skin is warm and dry.   Neurological:      Mental Status: He is alert and oriented to person, place, and time.   Psychiatric:         Behavior: Behavior normal.       Assessment:     1. Routine general medical examination at a health care facility    2. Tobacco use      Plan:     Orders Placed This Encounter    CBC auto differential    Comprehensive metabolic panel    Lipid Panel    HIV 1/2 Ag/Ab (4th Gen)    Hepatitis C Antibody    RPR     Due for  Vaccines  - at your pharmacy    I will email results    ============================  We discussed the RISKS of SMOKING - high risk of cancer , heart attack and stroke.    FREE SMOKING CESSATION PROGRAM AT [facility]  Sign up for online ID card [email or web address]  Or  [email or web address]. Then call  [phone].    ===============================  ================================  RECOMMENDATIONS FOR MALES   ================================    Your #1 MEDICINE is DAILY EXERCISE - 15-20 minutes of huffing & puffing EVERY DAY.     Prevent the #1 cause of death- cardiovascular disease (HEART ATTACK & STROKE) by checking for normal blood pressure, cholesterol, sugars, & by not smoking.     VACCINES: Yearly FLU shot,     If you EVER SMOKED - Abdominal Aortic Aneurysm ultrasound once age 65-75      I recommend  high fiber (5 fresh fruits or vegetables daily), low fat diet and aerobic  exercise (huffing/ puffing/ sweating for 20 min straight at least 4 days a week)    Follow up yearly with fasting lipids, CMP, CBC  prior        There are no Patient Instructions on file for this visit.                            Answers for HPI/ROS submitted by the patient on 9/14/2020   activity change: No  unexpected weight change: No  neck pain: Yes  hearing loss: No  rhinorrhea: No  trouble swallowing: No  eye discharge:  No  visual disturbance: No  chest tightness: No  wheezing: No  chest pain: No  palpitations: No  blood in stool: No  constipation: No  vomiting: No  diarrhea: No  polydipsia: No  polyuria: No  difficulty urinating: No  urgency: No  hematuria: No  joint swelling: No  arthralgias: No  headaches: No  weakness: No  confusion: No  dysphoric mood: No

## 2020-09-16 LAB
HCV AB SERPL QL IA: NEGATIVE
HIV 1+2 AB+HIV1 P24 AG SERPL QL IA: NEGATIVE
RPR SER QL: NORMAL

## 2020-09-17 NOTE — PROGRESS NOTES
Hello!    Your STD - HIV, Hepatitis C & syphillis tests are negative    Since your  blood count (Hemoglobin) is normal, you are not anemic.     Since the sugar test (Glucose)  is normal, you do not have diabetes.     Your kidney (Creatinine)  and liver (AST, ALT) function is  normal. These are the filters in your body for medicine, food and liquids that you ingest.     Your Cholesterol is NORMAL    Focus on low fat, high fiber foods and aerobic exericse (huffing & puffing) for at least 20 minutes most days of the week.    Let's recheck your basic fasting bloodwork PRIOR to your yearly physical with me -- so we can discuss results at your visit    Take care!

## 2021-02-11 ENCOUNTER — OFFICE VISIT (OUTPATIENT)
Dept: URGENT CARE | Facility: CLINIC | Age: 31
End: 2021-02-11

## 2021-02-11 VITALS
HEART RATE: 67 BPM | WEIGHT: 173 LBS | RESPIRATION RATE: 18 BRPM | HEIGHT: 72 IN | TEMPERATURE: 98 F | SYSTOLIC BLOOD PRESSURE: 104 MMHG | BODY MASS INDEX: 23.43 KG/M2 | DIASTOLIC BLOOD PRESSURE: 70 MMHG | OXYGEN SATURATION: 98 %

## 2021-02-11 DIAGNOSIS — Z20.822 ENCOUNTER FOR LABORATORY TESTING FOR COVID-19 VIRUS: ICD-10-CM

## 2021-02-11 DIAGNOSIS — Z11.9 SCREENING EXAMINATION FOR UNSPECIFIED INFECTIOUS DISEASE: Primary | ICD-10-CM

## 2021-02-11 PROCEDURE — 99214 PR OFFICE/OUTPT VISIT, EST, LEVL IV, 30-39 MIN: ICD-10-PCS | Mod: TIER,S$GLB,, | Performed by: NURSE PRACTITIONER

## 2021-02-11 PROCEDURE — U0005 INFEC AGEN DETEC AMPLI PROBE: HCPCS

## 2021-02-11 PROCEDURE — 99214 OFFICE O/P EST MOD 30 MIN: CPT | Mod: TIER,S$GLB,, | Performed by: NURSE PRACTITIONER

## 2021-02-11 PROCEDURE — U0003 INFECTIOUS AGENT DETECTION BY NUCLEIC ACID (DNA OR RNA); SEVERE ACUTE RESPIRATORY SYNDROME CORONAVIRUS 2 (SARS-COV-2) (CORONAVIRUS DISEASE [COVID-19]), AMPLIFIED PROBE TECHNIQUE, MAKING USE OF HIGH THROUGHPUT TECHNOLOGIES AS DESCRIBED BY CMS-2020-01-R: HCPCS

## 2021-02-12 LAB — SARS-COV-2 RNA RESP QL NAA+PROBE: NOT DETECTED

## 2021-04-26 ENCOUNTER — PATIENT MESSAGE (OUTPATIENT)
Dept: RESEARCH | Facility: HOSPITAL | Age: 31
End: 2021-04-26

## 2021-10-05 ENCOUNTER — PATIENT MESSAGE (OUTPATIENT)
Dept: ADMINISTRATIVE | Facility: HOSPITAL | Age: 31
End: 2021-10-05

## 2022-01-10 ENCOUNTER — TELEPHONE (OUTPATIENT)
Dept: PRIMARY CARE CLINIC | Facility: CLINIC | Age: 32
End: 2022-01-10
Payer: COMMERCIAL

## 2022-01-10 DIAGNOSIS — Z00.00 ROUTINE GENERAL MEDICAL EXAMINATION AT A HEALTH CARE FACILITY: Primary | ICD-10-CM

## 2022-01-10 DIAGNOSIS — Z00.00 ANNUAL PHYSICAL EXAM: Primary | ICD-10-CM

## 2022-01-10 NOTE — TELEPHONE ENCOUNTER
Patient requesting to have these labs done in addition to routine annual labs: Have normal yearly blood work and have my T and B cell levels check for Covid immunity.    I have scheduled his annual exam for Thursday 2/10/22.

## 2022-01-11 ENCOUNTER — PATIENT MESSAGE (OUTPATIENT)
Dept: PRIMARY CARE CLINIC | Facility: CLINIC | Age: 32
End: 2022-01-11
Payer: COMMERCIAL

## 2022-01-11 NOTE — TELEPHONE ENCOUNTER
I can add the basic COVID antibody test that [facility] has,     but we don't have the separate B & C or nucleocapsid test    Basic lab orders & COVID Ab in (can cancel the previous labs, or whatever is easiest)

## 2022-02-07 ENCOUNTER — LAB VISIT (OUTPATIENT)
Dept: LAB | Facility: HOSPITAL | Age: 32
End: 2022-02-07
Attending: FAMILY MEDICINE
Payer: COMMERCIAL

## 2022-02-07 DIAGNOSIS — Z00.00 ROUTINE GENERAL MEDICAL EXAMINATION AT A HEALTH CARE FACILITY: ICD-10-CM

## 2022-02-07 LAB
ALBUMIN SERPL BCP-MCNC: 4.3 G/DL (ref 3.5–5.2)
ALP SERPL-CCNC: 65 U/L (ref 55–135)
ALT SERPL W/O P-5'-P-CCNC: 20 U/L (ref 10–44)
ANION GAP SERPL CALC-SCNC: 6 MMOL/L (ref 8–16)
AST SERPL-CCNC: 20 U/L (ref 10–40)
BASOPHILS # BLD AUTO: 0.06 K/UL (ref 0–0.2)
BASOPHILS NFR BLD: 1 % (ref 0–1.9)
BILIRUB SERPL-MCNC: 0.6 MG/DL (ref 0.1–1)
BUN SERPL-MCNC: 13 MG/DL (ref 6–20)
CALCIUM SERPL-MCNC: 9.5 MG/DL (ref 8.7–10.5)
CHLORIDE SERPL-SCNC: 106 MMOL/L (ref 95–110)
CHOLEST SERPL-MCNC: 136 MG/DL (ref 120–199)
CHOLEST/HDLC SERPL: 2.1 {RATIO} (ref 2–5)
CO2 SERPL-SCNC: 28 MMOL/L (ref 23–29)
CREAT SERPL-MCNC: 1 MG/DL (ref 0.5–1.4)
DIFFERENTIAL METHOD: ABNORMAL
EOSINOPHIL # BLD AUTO: 0.3 K/UL (ref 0–0.5)
EOSINOPHIL NFR BLD: 4.7 % (ref 0–8)
ERYTHROCYTE [DISTWIDTH] IN BLOOD BY AUTOMATED COUNT: 13.8 % (ref 11.5–14.5)
EST. GFR  (AFRICAN AMERICAN): >60 ML/MIN/1.73 M^2
EST. GFR  (NON AFRICAN AMERICAN): >60 ML/MIN/1.73 M^2
GLUCOSE SERPL-MCNC: 88 MG/DL (ref 70–110)
HCT VFR BLD AUTO: 45.5 % (ref 40–54)
HDLC SERPL-MCNC: 64 MG/DL (ref 40–75)
HDLC SERPL: 47.1 % (ref 20–50)
HGB BLD-MCNC: 14.3 G/DL (ref 14–18)
IMM GRANULOCYTES # BLD AUTO: 0.01 K/UL (ref 0–0.04)
IMM GRANULOCYTES NFR BLD AUTO: 0.2 % (ref 0–0.5)
LDLC SERPL CALC-MCNC: 63 MG/DL (ref 63–159)
LYMPHOCYTES # BLD AUTO: 3.4 K/UL (ref 1–4.8)
LYMPHOCYTES NFR BLD: 54.7 % (ref 18–48)
MCH RBC QN AUTO: 28.5 PG (ref 27–31)
MCHC RBC AUTO-ENTMCNC: 31.4 G/DL (ref 32–36)
MCV RBC AUTO: 91 FL (ref 82–98)
MONOCYTES # BLD AUTO: 0.6 K/UL (ref 0.3–1)
MONOCYTES NFR BLD: 8.8 % (ref 4–15)
NEUTROPHILS # BLD AUTO: 1.9 K/UL (ref 1.8–7.7)
NEUTROPHILS NFR BLD: 30.6 % (ref 38–73)
NONHDLC SERPL-MCNC: 72 MG/DL
NRBC BLD-RTO: 0 /100 WBC
PLATELET # BLD AUTO: 235 K/UL (ref 150–450)
PMV BLD AUTO: 10.3 FL (ref 9.2–12.9)
POTASSIUM SERPL-SCNC: 4.4 MMOL/L (ref 3.5–5.1)
PROT SERPL-MCNC: 7.2 G/DL (ref 6–8.4)
RBC # BLD AUTO: 5.01 M/UL (ref 4.6–6.2)
SARS-COV-2 IGG SERPL IA-ACNC: 2552.7 AU/ML
SARS-COV-2 IGG SERPL QL IA: POSITIVE
SODIUM SERPL-SCNC: 140 MMOL/L (ref 136–145)
TRIGL SERPL-MCNC: 45 MG/DL (ref 30–150)
WBC # BLD AUTO: 6.23 K/UL (ref 3.9–12.7)

## 2022-02-07 PROCEDURE — 85025 COMPLETE CBC W/AUTO DIFF WBC: CPT | Performed by: FAMILY MEDICINE

## 2022-02-07 PROCEDURE — 36415 COLL VENOUS BLD VENIPUNCTURE: CPT | Mod: PN | Performed by: FAMILY MEDICINE

## 2022-02-07 PROCEDURE — 80061 LIPID PANEL: CPT | Performed by: FAMILY MEDICINE

## 2022-02-07 PROCEDURE — 86769 SARS-COV-2 COVID-19 ANTIBODY: CPT | Performed by: FAMILY MEDICINE

## 2022-02-07 PROCEDURE — 80053 COMPREHEN METABOLIC PANEL: CPT | Performed by: FAMILY MEDICINE

## 2022-02-10 ENCOUNTER — LAB VISIT (OUTPATIENT)
Dept: LAB | Facility: HOSPITAL | Age: 32
End: 2022-02-10
Attending: FAMILY MEDICINE
Payer: COMMERCIAL

## 2022-02-10 ENCOUNTER — OFFICE VISIT (OUTPATIENT)
Dept: PRIMARY CARE CLINIC | Facility: CLINIC | Age: 32
End: 2022-02-10
Payer: COMMERCIAL

## 2022-02-10 VITALS
HEIGHT: 72 IN | TEMPERATURE: 98 F | OXYGEN SATURATION: 99 % | BODY MASS INDEX: 23.89 KG/M2 | HEART RATE: 64 BPM | SYSTOLIC BLOOD PRESSURE: 102 MMHG | DIASTOLIC BLOOD PRESSURE: 68 MMHG | WEIGHT: 176.38 LBS

## 2022-02-10 DIAGNOSIS — Z00.00 ANNUAL PHYSICAL EXAM: ICD-10-CM

## 2022-02-10 DIAGNOSIS — Z00.00 ANNUAL PHYSICAL EXAM: Primary | ICD-10-CM

## 2022-02-10 PROCEDURE — 1159F PR MEDICATION LIST DOCUMENTED IN MEDICAL RECORD: ICD-10-PCS | Mod: CPTII,S$GLB,, | Performed by: FAMILY MEDICINE

## 2022-02-10 PROCEDURE — 99999 PR PBB SHADOW E&M-EST. PATIENT-LVL III: CPT | Mod: PBBFAC,,, | Performed by: FAMILY MEDICINE

## 2022-02-10 PROCEDURE — 84402 ASSAY OF FREE TESTOSTERONE: CPT | Performed by: FAMILY MEDICINE

## 2022-02-10 PROCEDURE — 99395 PR PREVENTIVE VISIT,EST,18-39: ICD-10-PCS | Mod: S$GLB,,, | Performed by: FAMILY MEDICINE

## 2022-02-10 PROCEDURE — 3008F BODY MASS INDEX DOCD: CPT | Mod: CPTII,S$GLB,, | Performed by: FAMILY MEDICINE

## 2022-02-10 PROCEDURE — 36415 COLL VENOUS BLD VENIPUNCTURE: CPT | Mod: PN | Performed by: FAMILY MEDICINE

## 2022-02-10 PROCEDURE — 3074F PR MOST RECENT SYSTOLIC BLOOD PRESSURE < 130 MM HG: ICD-10-PCS | Mod: CPTII,S$GLB,, | Performed by: FAMILY MEDICINE

## 2022-02-10 PROCEDURE — 1160F RVW MEDS BY RX/DR IN RCRD: CPT | Mod: CPTII,S$GLB,, | Performed by: FAMILY MEDICINE

## 2022-02-10 PROCEDURE — 3008F PR BODY MASS INDEX (BMI) DOCUMENTED: ICD-10-PCS | Mod: CPTII,S$GLB,, | Performed by: FAMILY MEDICINE

## 2022-02-10 PROCEDURE — 1160F PR REVIEW ALL MEDS BY PRESCRIBER/CLIN PHARMACIST DOCUMENTED: ICD-10-PCS | Mod: CPTII,S$GLB,, | Performed by: FAMILY MEDICINE

## 2022-02-10 PROCEDURE — 3078F PR MOST RECENT DIASTOLIC BLOOD PRESSURE < 80 MM HG: ICD-10-PCS | Mod: CPTII,S$GLB,, | Performed by: FAMILY MEDICINE

## 2022-02-10 PROCEDURE — 3074F SYST BP LT 130 MM HG: CPT | Mod: CPTII,S$GLB,, | Performed by: FAMILY MEDICINE

## 2022-02-10 PROCEDURE — 99999 PR PBB SHADOW E&M-EST. PATIENT-LVL III: ICD-10-PCS | Mod: PBBFAC,,, | Performed by: FAMILY MEDICINE

## 2022-02-10 PROCEDURE — 99395 PREV VISIT EST AGE 18-39: CPT | Mod: S$GLB,,, | Performed by: FAMILY MEDICINE

## 2022-02-10 PROCEDURE — 3078F DIAST BP <80 MM HG: CPT | Mod: CPTII,S$GLB,, | Performed by: FAMILY MEDICINE

## 2022-02-10 PROCEDURE — 1159F MED LIST DOCD IN RCRD: CPT | Mod: CPTII,S$GLB,, | Performed by: FAMILY MEDICINE

## 2022-02-10 NOTE — PROGRESS NOTES
Subjective:      Patient ID: William Schwartz is a 32 y.o. male.    Chief Complaint: Annual Exam    Here today for general exam.     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     Requesting testosterone level. Some fatigue, n o supplements    Denies any chest pain, shortness of breath, nausea vomiting constipation diarrhea, blood in stool, heartburn    No current outpatient medications on file.    No results found for: HGBA1C  No results found for: MICALBCREAT  Lab Results   Component Value Date    LDLCALC 63.0 02/07/2022    LDLCALC 80.4 09/15/2020    CHOL 136 02/07/2022    HDL 64 02/07/2022    TRIG 45 02/07/2022       Lab Results   Component Value Date     02/07/2022    K 4.4 02/07/2022     02/07/2022    CO2 28 02/07/2022    GLU 88 02/07/2022    BUN 13 02/07/2022    CREATININE 1.0 02/07/2022    CALCIUM 9.5 02/07/2022    PROT 7.2 02/07/2022    ALBUMIN 4.3 02/07/2022    BILITOT 0.6 02/07/2022    ALKPHOS 65 02/07/2022    AST 20 02/07/2022    ALT 20 02/07/2022    ANIONGAP 6 (L) 02/07/2022    ESTGFRAFRICA >60.0 02/07/2022    EGFRNONAA >60.0 02/07/2022    WBC 6.23 02/07/2022    HGB 14.3 02/07/2022    HGB 14.1 09/15/2020    HCT 45.5 02/07/2022    MCV 91 02/07/2022     02/07/2022    TSH 0.727 05/27/2017    HEPCAB Negative 09/15/2020       Lab Results   Component Value Date    FERRITIN 67 05/27/2017    IRON 61 05/27/2017    TRANSFERRIN 217 05/27/2017    TIBC 321 05/27/2017    FESATURATED 19 (L) 05/27/2017         Past Medical History:   Diagnosis Date    Tobacco use 5/7/2019    1ppd     Past Surgical History:   Procedure Laterality Date    NO PAST SURGERIES       Social History     Social History Narrative    Not on file     Family History   Problem Relation Age of Onset    Diabetes Mother         type 2    No Known Problems Father     Migraines Sister     Diabetes Brother         type 2    No Known Problems Son     Heart disease Maternal Grandmother     No Known Problems Brother     No  Known Problems Brother     No Known Problems Sister     Cancer Paternal Aunt      Vitals:    02/10/22 1348   BP: 102/68   Pulse: 64   Temp: 98 °F (36.7 °C)   SpO2: 99%   Weight: 80 kg (176 lb 5.9 oz)   Height: 6' (1.829 m)   PainSc: 0-No pain     Objective:   Physical Exam  Constitutional:       Appearance: He is well-developed.   HENT:      Head: Normocephalic and atraumatic.      Nose:      Comments: Wearing mask due to current COVID 19 pandemic, Nose & mouth exam deferred  Eyes:      Pupils: Pupils are equal, round, and reactive to light.   Neck:      Thyroid: No thyromegaly.   Cardiovascular:      Rate and Rhythm: Normal rate and regular rhythm.      Heart sounds: Normal heart sounds. No murmur heard.      Pulmonary:      Effort: Pulmonary effort is normal.      Breath sounds: Normal breath sounds. No wheezing.   Abdominal:      General: Bowel sounds are normal. There is no distension.      Palpations: Abdomen is soft. There is no mass.      Tenderness: There is no abdominal tenderness. There is no guarding or rebound.   Musculoskeletal:      Cervical back: Neck supple.   Lymphadenopathy:      Cervical: No cervical adenopathy.   Skin:     General: Skin is warm and dry.   Neurological:      Mental Status: He is alert and oriented to person, place, and time.   Psychiatric:         Behavior: Behavior normal.       Assessment:     1. Annual physical exam      Plan:     Orders Placed This Encounter    Testosterone, Free   per pt request    Due for  Vaccines  - at your pharmacy      ============================  We discussed the RISKS of SMOKING - high risk of cancer , heart attack and stroke.    FREE SMOKING CESSATION PROGRAM AT [facility] [address]  Sign up for online ID card [email or web address]  Or  [email or web address]. Then call  [phone].      ===============================    ================================  RECOMMENDATIONS FOR MALES   ================================    Your #1 MEDICINE is  DAILY EXERCISE - 15-20 minutes of huffing & puffing EVERY DAY.     Prevent the #1 cause of death- cardiovascular disease (HEART ATTACK & STROKE) by checking for normal blood pressure, cholesterol, sugars, & by not smoking.     VACCINES: Yearly FLU shot, PNEUMONIA shot after 65,  SHINGLES shot after 50    COLON CANCER screening colonoscopy starting at 44 yo &  every 10 years (or Cologuard kit every 3 yrs) , repeat test sooner if POLYP is found    PROSTATE CANCER screening is controversial. We can discuss this & consider checking PSA from 55-69 years.     If you EVER SMOKED - Abdominal Aortic Aneurysm ultrasound once age 65-75      I recommend  high fiber (5 fresh fruits or vegetables daily), low fat diet and aerobic  exercise (huffing/ puffing/ sweating for 20 min straight at least 4 days a week)    Follow up yearly with fasting lipids, CMP, CBC  prior      There are no Patient Instructions on file for this visit.

## 2022-02-14 LAB — TESTOST FREE SERPL-MCNC: 3.9 PG/ML (ref 5.1–41.5)

## 2022-05-19 ENCOUNTER — PATIENT MESSAGE (OUTPATIENT)
Dept: PRIMARY CARE CLINIC | Facility: CLINIC | Age: 32
End: 2022-05-19
Payer: COMMERCIAL

## 2023-04-12 ENCOUNTER — TELEPHONE (OUTPATIENT)
Dept: PRIMARY CARE CLINIC | Facility: CLINIC | Age: 33
End: 2023-04-12
Payer: COMMERCIAL

## 2023-04-12 DIAGNOSIS — Z00.00 ROUTINE GENERAL MEDICAL EXAMINATION AT A HEALTH CARE FACILITY: Primary | ICD-10-CM

## 2023-04-12 NOTE — TELEPHONE ENCOUNTER
Pr requesting referral for low testosterone. His LOV was 2/10/22. Phys scheduled 5/24/23. I didn't pend anything because I didn't know if you wanted to see him first.

## 2023-04-12 NOTE — ADDENDUM NOTE
Addended by: DEBBIE SHI on: 4/12/2023 03:17 PM     Modules accepted: Orders     Patient has apmt with Dr Rushing in April

## 2023-04-12 NOTE — TELEPHONE ENCOUNTER
We'd need several low readings (more current than 2/2022) before Urology will see him. Order in for fasting labs plus testosterone

## 2023-04-12 NOTE — TELEPHONE ENCOUNTER
----- Message from Jessica Phelan sent at 4/12/2023 12:44 PM CDT -----  Patient is requesting a referral to a Urologist for low Testerone.

## 2023-07-25 ENCOUNTER — PATIENT MESSAGE (OUTPATIENT)
Dept: PRIMARY CARE CLINIC | Facility: CLINIC | Age: 33
End: 2023-07-25
Payer: COMMERCIAL

## 2023-08-16 ENCOUNTER — PATIENT MESSAGE (OUTPATIENT)
Dept: PRIMARY CARE CLINIC | Facility: CLINIC | Age: 33
End: 2023-08-16
Payer: COMMERCIAL

## 2023-08-16 DIAGNOSIS — R79.89 LOW TESTOSTERONE IN MALE: Primary | ICD-10-CM

## 2023-08-16 NOTE — TELEPHONE ENCOUNTER
Pt would like referral to endocrinologist or a urologist because he is anticipating his testosterone level to be low. He would like to schedule the appt after his annual in November because he will be in town. Do you want to enter a referral for him to schedule in November?

## 2023-08-17 NOTE — TELEPHONE ENCOUNTER
Spoke with Pt will call to let us know where he would like to have his labs done at.   Pt is out state right [place]

## 2023-11-20 ENCOUNTER — LAB VISIT (OUTPATIENT)
Dept: LAB | Facility: HOSPITAL | Age: 33
End: 2023-11-20
Attending: FAMILY MEDICINE
Payer: COMMERCIAL

## 2023-11-20 DIAGNOSIS — Z00.00 ROUTINE GENERAL MEDICAL EXAMINATION AT A HEALTH CARE FACILITY: ICD-10-CM

## 2023-11-20 LAB
ALBUMIN SERPL BCP-MCNC: 4.1 G/DL (ref 3.5–5.2)
ALP SERPL-CCNC: 74 U/L (ref 55–135)
ALT SERPL W/O P-5'-P-CCNC: 18 U/L (ref 10–44)
ANION GAP SERPL CALC-SCNC: 7 MMOL/L (ref 8–16)
AST SERPL-CCNC: 19 U/L (ref 10–40)
BASOPHILS # BLD AUTO: 0.06 K/UL (ref 0–0.2)
BASOPHILS NFR BLD: 1.1 % (ref 0–1.9)
BILIRUB SERPL-MCNC: 0.6 MG/DL (ref 0.1–1)
BUN SERPL-MCNC: 10 MG/DL (ref 6–20)
CALCIUM SERPL-MCNC: 9.2 MG/DL (ref 8.7–10.5)
CHLORIDE SERPL-SCNC: 105 MMOL/L (ref 95–110)
CHOLEST SERPL-MCNC: 124 MG/DL (ref 120–199)
CHOLEST/HDLC SERPL: 2.1 {RATIO} (ref 2–5)
CO2 SERPL-SCNC: 26 MMOL/L (ref 23–29)
CREAT SERPL-MCNC: 1.1 MG/DL (ref 0.5–1.4)
DIFFERENTIAL METHOD: ABNORMAL
EOSINOPHIL # BLD AUTO: 0.3 K/UL (ref 0–0.5)
EOSINOPHIL NFR BLD: 4.7 % (ref 0–8)
ERYTHROCYTE [DISTWIDTH] IN BLOOD BY AUTOMATED COUNT: 13.5 % (ref 11.5–14.5)
EST. GFR  (NO RACE VARIABLE): >60 ML/MIN/1.73 M^2
GLUCOSE SERPL-MCNC: 91 MG/DL (ref 70–110)
HCT VFR BLD AUTO: 43.6 % (ref 40–54)
HDLC SERPL-MCNC: 58 MG/DL (ref 40–75)
HDLC SERPL: 46.8 % (ref 20–50)
HGB BLD-MCNC: 13.9 G/DL (ref 14–18)
IMM GRANULOCYTES # BLD AUTO: 0.01 K/UL (ref 0–0.04)
IMM GRANULOCYTES NFR BLD AUTO: 0.2 % (ref 0–0.5)
LDLC SERPL CALC-MCNC: 59.8 MG/DL (ref 63–159)
LYMPHOCYTES # BLD AUTO: 3 K/UL (ref 1–4.8)
LYMPHOCYTES NFR BLD: 53.3 % (ref 18–48)
MCH RBC QN AUTO: 28 PG (ref 27–31)
MCHC RBC AUTO-ENTMCNC: 31.9 G/DL (ref 32–36)
MCV RBC AUTO: 88 FL (ref 82–98)
MONOCYTES # BLD AUTO: 0.4 K/UL (ref 0.3–1)
MONOCYTES NFR BLD: 7.8 % (ref 4–15)
NEUTROPHILS # BLD AUTO: 1.8 K/UL (ref 1.8–7.7)
NEUTROPHILS NFR BLD: 32.9 % (ref 38–73)
NONHDLC SERPL-MCNC: 66 MG/DL
NRBC BLD-RTO: 0 /100 WBC
PLATELET # BLD AUTO: 266 K/UL (ref 150–450)
PMV BLD AUTO: 10.3 FL (ref 9.2–12.9)
POTASSIUM SERPL-SCNC: 3.9 MMOL/L (ref 3.5–5.1)
PROT SERPL-MCNC: 6.8 G/DL (ref 6–8.4)
RBC # BLD AUTO: 4.96 M/UL (ref 4.6–6.2)
SODIUM SERPL-SCNC: 138 MMOL/L (ref 136–145)
TESTOST SERPL-MCNC: 698 NG/DL (ref 304–1227)
TRIGL SERPL-MCNC: 31 MG/DL (ref 30–150)
WBC # BLD AUTO: 5.53 K/UL (ref 3.9–12.7)

## 2023-11-20 PROCEDURE — 36415 COLL VENOUS BLD VENIPUNCTURE: CPT | Mod: PN | Performed by: FAMILY MEDICINE

## 2023-11-20 PROCEDURE — 85025 COMPLETE CBC W/AUTO DIFF WBC: CPT | Performed by: FAMILY MEDICINE

## 2023-11-20 PROCEDURE — 84403 ASSAY OF TOTAL TESTOSTERONE: CPT | Performed by: FAMILY MEDICINE

## 2023-11-20 PROCEDURE — 80061 LIPID PANEL: CPT | Performed by: FAMILY MEDICINE

## 2023-11-20 PROCEDURE — 80053 COMPREHEN METABOLIC PANEL: CPT | Performed by: FAMILY MEDICINE

## 2023-11-21 ENCOUNTER — OFFICE VISIT (OUTPATIENT)
Dept: PRIMARY CARE CLINIC | Facility: CLINIC | Age: 33
End: 2023-11-21
Payer: COMMERCIAL

## 2023-11-21 VITALS
WEIGHT: 182.13 LBS | BODY MASS INDEX: 24.67 KG/M2 | TEMPERATURE: 98 F | RESPIRATION RATE: 20 BRPM | HEART RATE: 73 BPM | SYSTOLIC BLOOD PRESSURE: 108 MMHG | OXYGEN SATURATION: 98 % | DIASTOLIC BLOOD PRESSURE: 64 MMHG | HEIGHT: 72 IN

## 2023-11-21 DIAGNOSIS — Z87.891 HISTORY OF TOBACCO USE: ICD-10-CM

## 2023-11-21 DIAGNOSIS — Z00.00 ROUTINE GENERAL MEDICAL EXAMINATION AT A HEALTH CARE FACILITY: Primary | ICD-10-CM

## 2023-11-21 PROBLEM — Z72.0 TOBACCO USE: Status: RESOLVED | Noted: 2019-05-07 | Resolved: 2023-11-21

## 2023-11-21 PROCEDURE — 3008F PR BODY MASS INDEX (BMI) DOCUMENTED: ICD-10-PCS | Mod: CPTII,S$GLB,, | Performed by: FAMILY MEDICINE

## 2023-11-21 PROCEDURE — 99999 PR PBB SHADOW E&M-EST. PATIENT-LVL III: CPT | Mod: PBBFAC,,, | Performed by: FAMILY MEDICINE

## 2023-11-21 PROCEDURE — 1159F MED LIST DOCD IN RCRD: CPT | Mod: CPTII,S$GLB,, | Performed by: FAMILY MEDICINE

## 2023-11-21 PROCEDURE — 3078F PR MOST RECENT DIASTOLIC BLOOD PRESSURE < 80 MM HG: ICD-10-PCS | Mod: CPTII,S$GLB,, | Performed by: FAMILY MEDICINE

## 2023-11-21 PROCEDURE — 1160F PR REVIEW ALL MEDS BY PRESCRIBER/CLIN PHARMACIST DOCUMENTED: ICD-10-PCS | Mod: CPTII,S$GLB,, | Performed by: FAMILY MEDICINE

## 2023-11-21 PROCEDURE — 3008F BODY MASS INDEX DOCD: CPT | Mod: CPTII,S$GLB,, | Performed by: FAMILY MEDICINE

## 2023-11-21 PROCEDURE — 99395 PREV VISIT EST AGE 18-39: CPT | Mod: S$GLB,,, | Performed by: FAMILY MEDICINE

## 2023-11-21 PROCEDURE — 99999 PR PBB SHADOW E&M-EST. PATIENT-LVL III: ICD-10-PCS | Mod: PBBFAC,,, | Performed by: FAMILY MEDICINE

## 2023-11-21 PROCEDURE — 99395 PR PREVENTIVE VISIT,EST,18-39: ICD-10-PCS | Mod: S$GLB,,, | Performed by: FAMILY MEDICINE

## 2023-11-21 PROCEDURE — 3074F PR MOST RECENT SYSTOLIC BLOOD PRESSURE < 130 MM HG: ICD-10-PCS | Mod: CPTII,S$GLB,, | Performed by: FAMILY MEDICINE

## 2023-11-21 PROCEDURE — 3074F SYST BP LT 130 MM HG: CPT | Mod: CPTII,S$GLB,, | Performed by: FAMILY MEDICINE

## 2023-11-21 PROCEDURE — 1160F RVW MEDS BY RX/DR IN RCRD: CPT | Mod: CPTII,S$GLB,, | Performed by: FAMILY MEDICINE

## 2023-11-21 PROCEDURE — 1159F PR MEDICATION LIST DOCUMENTED IN MEDICAL RECORD: ICD-10-PCS | Mod: CPTII,S$GLB,, | Performed by: FAMILY MEDICINE

## 2023-11-21 PROCEDURE — 3078F DIAST BP <80 MM HG: CPT | Mod: CPTII,S$GLB,, | Performed by: FAMILY MEDICINE

## 2023-11-21 NOTE — ASSESSMENT & PLAN NOTE
MOVE more, sit less  Eat more food grown from the earth (picked from trees or out of the ground)  If you urinate more than 2 times a night (prostate symptoms) let me know  COLON CANCER screening starting at 46 yo  Follow up yearly with LABS ONE WEEK PRIOR so we can discuss at your visit

## 2023-11-21 NOTE — PROGRESS NOTES
"      Assessment:     1. Routine general medical examination at a health care facility    2. History of tobacco use      Plan:     Routine general medical examination at a health care facility  MOVE more, sit less  Eat more food grown from the earth (picked from trees or out of the ground)  If you urinate more than 2 times a night (prostate symptoms) let me know  COLON CANCER screening starting at 44 yo  Follow up yearly with LABS ONE WEEK PRIOR so we can discuss at your visit      History of tobacco use  Stopped 2022    He  may research a male doctor that specializes in male health to discuss one Low Testosterone test he had in the past        CHIEF COMPLAINT: General exam    HPI: William Schwartz is a 33 y.o. male with is here today for general exam.     Lifting weights 3 weeks ago, I was doing squats & felt pain in my left lower back. I felt something funny. I did something to it, but it's better. Not painful. It's just sore.     Denies chest pain, shortness of breath    No current outpatient medications    No results found for: "HGBA1C"  No results found for: "MICALBCREAT"  Lab Results   Component Value Date    LDLCALC 59.8 (L) 11/20/2023    LDLCALC 63.0 02/07/2022    CHOL 124 11/20/2023    HDL 58 11/20/2023    TRIG 31 11/20/2023       Lab Results   Component Value Date     11/20/2023    K 3.9 11/20/2023     11/20/2023    CO2 26 11/20/2023    GLU 91 11/20/2023    BUN 10 11/20/2023    CREATININE 1.1 11/20/2023    CALCIUM 9.2 11/20/2023    PROT 6.8 11/20/2023    ALBUMIN 4.1 11/20/2023    BILITOT 0.6 11/20/2023    ALKPHOS 74 11/20/2023    AST 19 11/20/2023    ALT 18 11/20/2023    ANIONGAP 7 (L) 11/20/2023    ESTGFRAFRICA >60.0 02/07/2022    EGFRNONAA >60.0 02/07/2022    WBC 5.53 11/20/2023    HGB 13.9 (L) 11/20/2023    HGB 14.3 02/07/2022    HCT 43.6 11/20/2023    MCV 88 11/20/2023     11/20/2023    TSH 0.727 05/27/2017    HEPCAB Negative 09/15/2020       Lab Results   Component Value Date    " TOTALTESTOST 698 11/20/2023    FERRITIN 67 05/27/2017    IRON 61 05/27/2017    TRANSFERRIN 217 05/27/2017    TIBC 321 05/27/2017    FESATURATED 19 (L) 05/27/2017         Past Medical History:   Diagnosis Date    Low testosterone in male     3.9    Tobacco use 5/7/2019    1ppd     Past Surgical History:   Procedure Laterality Date    NO PAST SURGERIES       Vitals:    11/21/23 1249   BP: 108/64   Pulse: 73   Resp: 20   Temp: 98 °F (36.7 °C)   TempSrc: Oral   SpO2: 98%   Weight: 82.6 kg (182 lb 1.6 oz)   Height: 6' (1.829 m)   PainSc:   4   PainLoc: Back     Objective:   Physical Exam  Constitutional:       Appearance: He is well-developed.   Eyes:      Pupils: Pupils are equal, round, and reactive to light.   Cardiovascular:      Rate and Rhythm: Normal rate and regular rhythm.      Heart sounds: Normal heart sounds. No murmur heard.  Pulmonary:      Effort: Pulmonary effort is normal.      Breath sounds: Normal breath sounds. No wheezing.   Abdominal:      General: Bowel sounds are normal. There is no distension.      Palpations: Abdomen is soft. There is no mass.      Tenderness: There is no abdominal tenderness. There is no guarding or rebound.   Musculoskeletal:      Cervical back: Neck supple.   Skin:     General: Skin is warm and dry.   Neurological:      Mental Status: He is alert.   Psychiatric:         Behavior: Behavior normal.

## 2024-02-08 ENCOUNTER — HOSPITAL ENCOUNTER (OUTPATIENT)
Dept: RADIOLOGY | Facility: HOSPITAL | Age: 34
Discharge: HOME | End: 2024-02-08
Attending: FAMILY MEDICINE
Payer: COMMERCIAL

## 2024-02-08 ENCOUNTER — TELEPHONE (OUTPATIENT)
Dept: PRIMARY CARE CLINIC | Facility: CLINIC | Age: 34
End: 2024-02-08

## 2024-02-08 ENCOUNTER — OFFICE VISIT (OUTPATIENT)
Dept: PRIMARY CARE CLINIC | Facility: CLINIC | Age: 34
End: 2024-02-08
Payer: COMMERCIAL

## 2024-02-08 VITALS
DIASTOLIC BLOOD PRESSURE: 60 MMHG | BODY MASS INDEX: 26.07 KG/M2 | WEIGHT: 192.44 LBS | HEIGHT: 72 IN | OXYGEN SATURATION: 97 % | TEMPERATURE: 98 F | SYSTOLIC BLOOD PRESSURE: 108 MMHG | HEART RATE: 75 BPM

## 2024-02-08 DIAGNOSIS — G89.29 CHRONIC BILATERAL LOW BACK PAIN WITHOUT SCIATICA: Primary | ICD-10-CM

## 2024-02-08 DIAGNOSIS — G89.29 CHRONIC BILATERAL LOW BACK PAIN WITHOUT SCIATICA: ICD-10-CM

## 2024-02-08 DIAGNOSIS — M54.50 CHRONIC BILATERAL LOW BACK PAIN WITHOUT SCIATICA: ICD-10-CM

## 2024-02-08 DIAGNOSIS — M54.50 CHRONIC BILATERAL LOW BACK PAIN WITHOUT SCIATICA: Primary | ICD-10-CM

## 2024-02-08 PROCEDURE — 1159F MED LIST DOCD IN RCRD: CPT | Mod: CPTII,S$GLB,, | Performed by: FAMILY MEDICINE

## 2024-02-08 PROCEDURE — 99214 OFFICE O/P EST MOD 30 MIN: CPT | Mod: S$GLB,,, | Performed by: FAMILY MEDICINE

## 2024-02-08 PROCEDURE — 3074F SYST BP LT 130 MM HG: CPT | Mod: CPTII,S$GLB,, | Performed by: FAMILY MEDICINE

## 2024-02-08 PROCEDURE — 3008F BODY MASS INDEX DOCD: CPT | Mod: CPTII,S$GLB,, | Performed by: FAMILY MEDICINE

## 2024-02-08 PROCEDURE — 3078F DIAST BP <80 MM HG: CPT | Mod: CPTII,S$GLB,, | Performed by: FAMILY MEDICINE

## 2024-02-08 PROCEDURE — 72100 X-RAY EXAM L-S SPINE 2/3 VWS: CPT | Mod: TC,PN

## 2024-02-08 PROCEDURE — 99999 PR PBB SHADOW E&M-EST. PATIENT-LVL III: CPT | Mod: PBBFAC,,, | Performed by: FAMILY MEDICINE

## 2024-02-08 PROCEDURE — 1160F RVW MEDS BY RX/DR IN RCRD: CPT | Mod: CPTII,S$GLB,, | Performed by: FAMILY MEDICINE

## 2024-02-08 PROCEDURE — 72100 X-RAY EXAM L-S SPINE 2/3 VWS: CPT | Mod: 26,,, | Performed by: RADIOLOGY

## 2024-02-08 NOTE — PROGRESS NOTES
"      Assessment:     1. Chronic bilateral low back pain without sciatica      Plan:     Chronic bilateral low back pain without sciatica  5-6 yrs ago, did hit back when slipped down one step.     Chronic stiffness, soreness - since lifted weights in [place] 2023 & had injury. Needs to stretchebefore moving. Gets better, but every time I wake up I need to stretch it out. 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 No bowel or bladder incontinence. Not taking OTC meds.     Refer PT        CHIEF COMPLAINT: back pain    HPI: William Schwartz is a 34 y.o. male with is here today for back pain    No sciatica    No current outpatient medications    No results found for: "HGBA1C"  No results found for: "MICALBCREAT"  Lab Results   Component Value Date    LDLCALC 59.8 (L) 11/20/2023    LDLCALC 63.0 02/07/2022    CHOL 124 11/20/2023    HDL 58 11/20/2023    TRIG 31 11/20/2023       Lab Results   Component Value Date     11/20/2023    K 3.9 11/20/2023     11/20/2023    CO2 26 11/20/2023    GLU 91 11/20/2023    BUN 10 11/20/2023    CREATININE 1.1 11/20/2023    CALCIUM 9.2 11/20/2023    PROT 6.8 11/20/2023    ALBUMIN 4.1 11/20/2023    BILITOT 0.6 11/20/2023    ALKPHOS 74 11/20/2023    AST 19 11/20/2023    ALT 18 11/20/2023    ANIONGAP 7 (L) 11/20/2023    ESTGFRAFRICA >60.0 02/07/2022    EGFRNONAA >60.0 02/07/2022    WBC 5.53 11/20/2023    HGB 13.9 (L) 11/20/2023    HGB 14.3 02/07/2022    HCT 43.6 11/20/2023    MCV 88 11/20/2023     11/20/2023    TSH 0.727 05/27/2017    HEPCAB Negative 09/15/2020       Lab Results   Component Value Date    TOTALTESTOST 698 11/20/2023    FERRITIN 67 05/27/2017    IRON 61 05/27/2017    TRANSFERRIN 217 05/27/2017    TIBC 321 05/27/2017    FESATURATED 19 (L) 05/27/2017         Past Medical History:   Diagnosis Date    Low testosterone in male     3.9    Tobacco use 5/7/2019    1ppd     Past Surgical History:   Procedure Laterality Date    NO PAST SURGERIES       Vitals: "    02/08/24 1347   BP: 108/60   Pulse: 75   Temp: 98.3 °F (36.8 °C)   TempSrc: Oral   SpO2: 97%   Weight: 87.3 kg (192 lb 7.4 oz)   Height: 6' (1.829 m)   PainSc: 0-No pain     Wt Readings from Last 5 Encounters:   02/08/24 87.3 kg (192 lb 7.4 oz)   11/21/23 82.6 kg (182 lb 1.6 oz)   02/10/22 80 kg (176 lb 5.9 oz)   02/11/21 78.5 kg (173 lb)   09/15/20 80 kg (176 lb 5.9 oz)     Objective:   Physical Exam  Vitals and nursing note reviewed.   Constitutional:       Appearance: He is well-developed.   Cardiovascular:      Pulses:           Popliteal pulses are 2+ on the right side and 2+ on the left side.   Abdominal:      General: Bowel sounds are normal.      Palpations: Abdomen is soft.   Skin:     Findings: No rash.   Neurological:      Comments: Normal gait, mild difficulty going from sitting to standing position,  no scoliosis, nontender lumbar spine,   Can lean forward 90 degrees with pulling sensation in lower back, no pain with leaning back or side to side, no pain with twisting,  negative straight leg test, can walk on toes and heels,

## 2024-02-08 NOTE — ASSESSMENT & PLAN NOTE
5-6 yrs ago, did hit back when slipped down one step.     Chronic stiffness, soreness - since lifted weights in [place] 2023 & had injury. Needs to stretchebefore moving. Gets better, but every time I wake up I need to stretch it out. 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 No bowel or bladder incontinence. Not taking OTC meds.     Refer PT

## 2024-02-26 PROBLEM — Z00.00 ROUTINE GENERAL MEDICAL EXAMINATION AT A HEALTH CARE FACILITY: Status: RESOLVED | Noted: 2023-11-21 | Resolved: 2024-02-26

## 2025-04-16 ENCOUNTER — OFFICE VISIT (OUTPATIENT)
Dept: PRIMARY CARE CLINIC | Facility: CLINIC | Age: 35
End: 2025-04-16
Payer: COMMERCIAL

## 2025-04-16 ENCOUNTER — LAB VISIT (OUTPATIENT)
Dept: LAB | Facility: HOSPITAL | Age: 35
End: 2025-04-16
Attending: NURSE PRACTITIONER
Payer: COMMERCIAL

## 2025-04-16 VITALS
HEART RATE: 63 BPM | WEIGHT: 192.44 LBS | TEMPERATURE: 98 F | OXYGEN SATURATION: 97 % | RESPIRATION RATE: 19 BRPM | SYSTOLIC BLOOD PRESSURE: 122 MMHG | BODY MASS INDEX: 26.07 KG/M2 | HEIGHT: 72 IN | DIASTOLIC BLOOD PRESSURE: 68 MMHG

## 2025-04-16 DIAGNOSIS — Z72.0 TOBACCO USE: ICD-10-CM

## 2025-04-16 DIAGNOSIS — Z00.00 ANNUAL PHYSICAL EXAM: Primary | ICD-10-CM

## 2025-04-16 DIAGNOSIS — R68.82 LOW LIBIDO: ICD-10-CM

## 2025-04-16 DIAGNOSIS — R53.83 FATIGUE, UNSPECIFIED TYPE: ICD-10-CM

## 2025-04-16 DIAGNOSIS — R79.89 LOW TESTOSTERONE IN MALE: ICD-10-CM

## 2025-04-16 DIAGNOSIS — Z00.00 ANNUAL PHYSICAL EXAM: ICD-10-CM

## 2025-04-16 LAB
ABSOLUTE EOSINOPHIL (OHS): 0.24 K/UL
ABSOLUTE MONOCYTE (OHS): 0.46 K/UL (ref 0.3–1)
ABSOLUTE NEUTROPHIL COUNT (OHS): 1.58 K/UL (ref 1.8–7.7)
ALBUMIN SERPL BCP-MCNC: 4 G/DL (ref 3.5–5.2)
ALP SERPL-CCNC: 61 UNIT/L (ref 40–150)
ALT SERPL W/O P-5'-P-CCNC: 19 UNIT/L (ref 10–44)
ANION GAP (OHS): 10 MMOL/L (ref 8–16)
AST SERPL-CCNC: 27 UNIT/L (ref 11–45)
BASOPHILS # BLD AUTO: 0.06 K/UL
BASOPHILS NFR BLD AUTO: 1.4 %
BILIRUB SERPL-MCNC: 0.7 MG/DL (ref 0.1–1)
BUN SERPL-MCNC: 10 MG/DL (ref 6–20)
CALCIUM SERPL-MCNC: 9.2 MG/DL (ref 8.7–10.5)
CHLORIDE SERPL-SCNC: 104 MMOL/L (ref 95–110)
CHOLEST SERPL-MCNC: 131 MG/DL (ref 120–199)
CHOLEST/HDLC SERPL: 2.4 {RATIO} (ref 2–5)
CO2 SERPL-SCNC: 25 MMOL/L (ref 23–29)
CREAT SERPL-MCNC: 1.1 MG/DL (ref 0.5–1.4)
EAG (OHS): 103 MG/DL (ref 68–131)
ERYTHROCYTE [DISTWIDTH] IN BLOOD BY AUTOMATED COUNT: 13.6 % (ref 11.5–14.5)
GFR SERPLBLD CREATININE-BSD FMLA CKD-EPI: >60 ML/MIN/1.73/M2
GLUCOSE SERPL-MCNC: 80 MG/DL (ref 70–110)
HAV IGM SERPL QL IA: NORMAL
HBA1C MFR BLD: 5.2 % (ref 4–5.6)
HBV CORE IGM SERPL QL IA: NORMAL
HBV SURFACE AG SERPL QL IA: NORMAL
HCT VFR BLD AUTO: 44.2 % (ref 40–54)
HCV AB SERPL QL IA: NORMAL
HDLC SERPL-MCNC: 54 MG/DL (ref 40–75)
HDLC SERPL: 41.2 % (ref 20–50)
HGB BLD-MCNC: 14.3 GM/DL (ref 14–18)
HIV 1+2 AB+HIV1 P24 AG SERPL QL IA: NORMAL
IMM GRANULOCYTES # BLD AUTO: 0 K/UL (ref 0–0.04)
IMM GRANULOCYTES NFR BLD AUTO: 0 % (ref 0–0.5)
LDLC SERPL CALC-MCNC: 67.6 MG/DL (ref 63–159)
LYMPHOCYTES # BLD AUTO: 1.8 K/UL (ref 1–4.8)
MCH RBC QN AUTO: 28.6 PG (ref 27–31)
MCHC RBC AUTO-ENTMCNC: 32.4 G/DL (ref 32–36)
MCV RBC AUTO: 88 FL (ref 82–98)
NONHDLC SERPL-MCNC: 77 MG/DL
NUCLEATED RBC (/100WBC) (OHS): 0 /100 WBC
PLATELET # BLD AUTO: 236 K/UL (ref 150–450)
PMV BLD AUTO: 9.7 FL (ref 9.2–12.9)
POTASSIUM SERPL-SCNC: 5.2 MMOL/L (ref 3.5–5.1)
PROT SERPL-MCNC: 6.8 GM/DL (ref 6–8.4)
RBC # BLD AUTO: 5 M/UL (ref 4.6–6.2)
RELATIVE EOSINOPHIL (OHS): 5.8 %
RELATIVE LYMPHOCYTE (OHS): 43.5 % (ref 18–48)
RELATIVE MONOCYTE (OHS): 11.1 % (ref 4–15)
RELATIVE NEUTROPHIL (OHS): 38.2 % (ref 38–73)
SODIUM SERPL-SCNC: 139 MMOL/L (ref 136–145)
T PALLIDUM IGG+IGM SER QL: NORMAL
TESTOST SERPL-MCNC: 766 NG/DL (ref 304–1227)
TRIGL SERPL-MCNC: 47 MG/DL (ref 30–150)
TSH SERPL-ACNC: 0.72 UIU/ML (ref 0.4–4)
WBC # BLD AUTO: 4.14 K/UL (ref 3.9–12.7)

## 2025-04-16 PROCEDURE — 80061 LIPID PANEL: CPT

## 2025-04-16 PROCEDURE — 84153 ASSAY OF PSA TOTAL: CPT

## 2025-04-16 PROCEDURE — 36415 COLL VENOUS BLD VENIPUNCTURE: CPT | Mod: PN

## 2025-04-16 PROCEDURE — 84403 ASSAY OF TOTAL TESTOSTERONE: CPT

## 2025-04-16 PROCEDURE — 84443 ASSAY THYROID STIM HORMONE: CPT

## 2025-04-16 PROCEDURE — 87491 CHLMYD TRACH DNA AMP PROBE: CPT | Performed by: NURSE PRACTITIONER

## 2025-04-16 PROCEDURE — 80053 COMPREHEN METABOLIC PANEL: CPT

## 2025-04-16 PROCEDURE — 80074 ACUTE HEPATITIS PANEL: CPT

## 2025-04-16 PROCEDURE — 85025 COMPLETE CBC W/AUTO DIFF WBC: CPT

## 2025-04-16 PROCEDURE — 86593 SYPHILIS TEST NON-TREP QUANT: CPT

## 2025-04-16 PROCEDURE — 99999 PR PBB SHADOW E&M-EST. PATIENT-LVL IV: CPT | Mod: PBBFAC,,, | Performed by: NURSE PRACTITIONER

## 2025-04-16 PROCEDURE — 83036 HEMOGLOBIN GLYCOSYLATED A1C: CPT

## 2025-04-16 PROCEDURE — 87389 HIV-1 AG W/HIV-1&-2 AB AG IA: CPT

## 2025-04-16 NOTE — PROGRESS NOTES
[facility] Note    Barry Rosario, TORY    Assessment/Plan     William Schwartz is a pleasant 35 y.o.male. Seen for the following today.      ICD-10-CM ICD-9-CM   1. Annual physical exam  Z00.00 V70.0   2. Low testosterone in male  R79.89 790.99   3. Tobacco use  Z72.0 305.1   4. Fatigue, unspecified type  R53.83 780.79   5. Low libido  R68.82 799.81         Orders Placed This Encounter   Procedures    CBC Auto Differential    Comprehensive Metabolic Panel    Hemoglobin A1C    Lipid Panel    Testosterone    HIV 1/2 Ag/Ab (4th Gen)    Treponema Pallidium Antibodies IgG, IgM    Hepatitis Panel, Acute    C. trachomatis/N. gonorrhoeae by AMP DNA [facility]; Urine    TSH        BMI elevated but muscle mass is higher than average. Physical exam unremarkable. Family hx of diabetes, sickle cell, heart disease. Labs ordered listed below. Declines smoking cessation information or referral.    No future appointments.        History of Present Illness      William Schwartz is a 35 y.o. male with who presents today for: Annual Exam     Presents for annual blood work. Wants testosterone because of a history of low testosterone, intermittent fatigue, and intermittent low libido.  He is a  and travels for months on time.  In the past 2 years he has had 2 partners and would like STI testing for everything.  Smoked for 20 years. Down to 1 ppd to 1/32 ppd. No other problems or concerns.    Problem list:    Active Problem List with Overview Notes    Diagnosis Date Noted    Chronic bilateral low back pain without sciatica 02/08/2024    History of tobacco use 05/07/2019     1ppd      No diagnosis on Axis I 02/07/2019        ast Medical History:  Past Medical History:   Diagnosis Date    Low testosterone in male     3.9    Tobacco use 5/7/2019    1ppd       Past Surgical History:   has a past surgical history that includes No past surgeries.    Family History:  family history includes Cancer in his paternal  aunt; Diabetes in his brother and mother; Heart disease in his maternal grandmother; Migraines in his sister; No Known Problems in his brother, brother, father, maternal grandfather, paternal grandfather, paternal grandmother, and son; Sickle cell anemia in his brother and sister.     Social History:  Social History[1]    I personally reviewed all past medical, surgical, social and family history.      Medications:     No current medications    Allergies:  Review of patient's allergies indicates:   Allergen Reactions    Iodine Swelling    Shellfish containing products        Health Maintenance:  Health Maintenance   Topic Date Due    Pneumococcal Vaccines (Age 0-49) (1 of 2 - PCV) Never done    Hemoglobin A1c (Diabetic Prevention Screening)  Never done    COVID-19 Vaccine (1 - 2024-25 season) Never done    Lipid Panel  11/20/2028    TETANUS VACCINE  05/07/2029    RSV Vaccine (Age 60+ and Pregnant patients) (1 - 1-dose 75+ series) 01/15/2064    Hepatitis C Screening  Completed    HIV Screening  Completed    Influenza Vaccine  Addressed       Physical Exam      Vital Signs  Temp: 98.2 °F (36.8 °C)  Temp Source: Oral  Pulse: 63  Resp: 19  SpO2: 97 %  BP: 122/68  BP Location: Right arm  Patient Position: Sitting  Pain Score: 0-No pain  Height and Weight  Height: 6' (182.9 cm)  Weight: 87.3 kg (192 lb 7.4 oz)  BSA (Calculated - sq m): 2.11 sq meters  BMI (Calculated): 26.1  Weight in (lb) to have BMI = 25: 183.9]      Physical Exam  Vitals reviewed.   HENT:      Head: Normocephalic and atraumatic.      Right Ear: Tympanic membrane normal.      Left Ear: Tympanic membrane normal.      Mouth/Throat:      Mouth: Mucous membranes are moist.      Pharynx: No oropharyngeal exudate or posterior oropharyngeal erythema.   Eyes:      Extraocular Movements: Extraocular movements intact.      Conjunctiva/sclera: Conjunctivae normal.      Pupils: Pupils are equal, round, and reactive to light.   Neck:      Vascular: No carotid bruit.    Cardiovascular:      Rate and Rhythm: Normal rate and regular rhythm.   Pulmonary:      Effort: Pulmonary effort is normal. No respiratory distress.      Breath sounds: Normal breath sounds.   Abdominal:      General: Bowel sounds are normal. There is no distension.      Palpations: Abdomen is soft. There is no hepatomegaly, splenomegaly or mass.      Tenderness: There is no abdominal tenderness. There is no guarding.   Lymphadenopathy:      Cervical: No cervical adenopathy.   Skin:     Capillary Refill: Capillary refill takes less than 2 seconds.   Neurological:      Mental Status: He is alert and oriented to person, place, and time.      Cranial Nerves: No cranial nerve deficit.      Motor: No weakness.      Deep Tendon Reflexes: Reflexes normal.   Psychiatric:         Mood and Affect: Mood normal.         Thought Content: Thought content normal.         Judgment: Judgment normal.                           [1]   Social History  Tobacco Use    Smoking status: Every Day     Current packs/day: 0.50     Types: Cigarettes    Smokeless tobacco: Never   Substance Use Topics    Drug use: Yes     Types: Marijuana

## 2025-04-17 ENCOUNTER — RESULTS FOLLOW-UP (OUTPATIENT)
Dept: PRIMARY CARE CLINIC | Facility: CLINIC | Age: 35
End: 2025-04-17

## 2025-04-17 LAB — PSA SERPL-MCNC: 1.17 NG/ML

## 2025-04-19 LAB
C TRACH DNA SPEC QL NAA+PROBE: NOT DETECTED
CTGC SOURCE (OHS) ORD-325: NORMAL
N GONORRHOEA DNA UR QL NAA+PROBE: NOT DETECTED